# Patient Record
Sex: MALE | Race: WHITE | NOT HISPANIC OR LATINO | Employment: FULL TIME | URBAN - METROPOLITAN AREA
[De-identification: names, ages, dates, MRNs, and addresses within clinical notes are randomized per-mention and may not be internally consistent; named-entity substitution may affect disease eponyms.]

---

## 2023-06-21 LAB
ALANINE AMINOTRANSFERASE (SGPT) (U/L) IN SER/PLAS: 42 U/L (ref 10–52)
ALBUMIN (G/DL) IN SER/PLAS: 4.3 G/DL (ref 3.4–5)
ALKALINE PHOSPHATASE (U/L) IN SER/PLAS: 62 U/L (ref 33–120)
ANION GAP IN SER/PLAS: 8 MMOL/L (ref 10–20)
ASPARTATE AMINOTRANSFERASE (SGOT) (U/L) IN SER/PLAS: 44 U/L (ref 9–39)
BILIRUBIN TOTAL (MG/DL) IN SER/PLAS: 0.4 MG/DL (ref 0–1.2)
CALCIUM (MG/DL) IN SER/PLAS: 9.1 MG/DL (ref 8.6–10.3)
CARBON DIOXIDE, TOTAL (MMOL/L) IN SER/PLAS: 29 MMOL/L (ref 21–32)
CHLORIDE (MMOL/L) IN SER/PLAS: 101 MMOL/L (ref 98–107)
CHOLESTEROL (MG/DL) IN SER/PLAS: 163 MG/DL (ref 0–199)
CHOLESTEROL IN HDL (MG/DL) IN SER/PLAS: 56.8 MG/DL
CHOLESTEROL/HDL RATIO: 2.9
CREATININE (MG/DL) IN SER/PLAS: 0.92 MG/DL (ref 0.5–1.3)
GFR MALE: >90 ML/MIN/1.73M2
GLUCOSE (MG/DL) IN SER/PLAS: 94 MG/DL (ref 74–99)
LDL: 93 MG/DL (ref 0–99)
POTASSIUM (MMOL/L) IN SER/PLAS: 4.1 MMOL/L (ref 3.5–5.3)
PROSTATE SPECIFIC AG (NG/ML) IN SER/PLAS: 0.26 NG/ML (ref 0–4)
PROTEIN TOTAL: 6.8 G/DL (ref 6.4–8.2)
SODIUM (MMOL/L) IN SER/PLAS: 134 MMOL/L (ref 136–145)
TRIGLYCERIDE (MG/DL) IN SER/PLAS: 64 MG/DL (ref 0–149)
UREA NITROGEN (MG/DL) IN SER/PLAS: 13 MG/DL (ref 6–23)
VLDL: 13 MG/DL (ref 0–40)

## 2023-06-26 LAB
METHYLPHENIDATE URINE QUANTITATIVE: 106.8 NG/ML
RITALINIC ACID URINE: >5000 NG/ML

## 2023-10-10 PROBLEM — F98.8 ATTENTION DEFICIT DISORDER (ADD): Status: ACTIVE | Noted: 2023-10-10

## 2023-10-10 PROBLEM — S83.512A LEFT ANTERIOR CRUCIATE LIGAMENT TEAR: Status: ACTIVE | Noted: 2023-10-10

## 2023-10-10 PROBLEM — J30.9 ALLERGIC RHINITIS: Status: ACTIVE | Noted: 2023-10-10

## 2023-10-10 PROBLEM — E55.9 VITAMIN D DEFICIENCY: Status: ACTIVE | Noted: 2023-10-10

## 2023-10-10 PROBLEM — E78.5 HYPERLIPIDEMIA: Status: ACTIVE | Noted: 2023-10-10

## 2023-10-10 RX ORDER — MELOXICAM 15 MG/1
1 TABLET ORAL DAILY PRN
COMMUNITY
Start: 2022-01-18 | End: 2024-01-05 | Stop reason: WASHOUT

## 2023-10-10 RX ORDER — CYCLOBENZAPRINE HCL 10 MG
1 TABLET ORAL NIGHTLY PRN
COMMUNITY
End: 2023-10-11 | Stop reason: SDUPTHER

## 2023-10-10 RX ORDER — CHOLECALCIFEROL (VITAMIN D3) 50 MCG
1 TABLET ORAL DAILY
COMMUNITY

## 2023-10-10 RX ORDER — ROSUVASTATIN CALCIUM 20 MG/1
1 TABLET, COATED ORAL DAILY
COMMUNITY
End: 2023-10-11 | Stop reason: SDUPTHER

## 2023-10-10 RX ORDER — METHYLPHENIDATE HYDROCHLORIDE 54 MG/1
1 TABLET ORAL DAILY
COMMUNITY
Start: 2022-08-18 | End: 2023-10-11 | Stop reason: SDUPTHER

## 2023-10-11 ENCOUNTER — OFFICE VISIT (OUTPATIENT)
Dept: PRIMARY CARE | Facility: CLINIC | Age: 53
End: 2023-10-11
Payer: COMMERCIAL

## 2023-10-11 VITALS
BODY MASS INDEX: 27.2 KG/M2 | DIASTOLIC BLOOD PRESSURE: 85 MMHG | SYSTOLIC BLOOD PRESSURE: 125 MMHG | TEMPERATURE: 97.2 F | WEIGHT: 190 LBS | HEART RATE: 107 BPM | HEIGHT: 70 IN

## 2023-10-11 DIAGNOSIS — E78.5 HYPERLIPIDEMIA, UNSPECIFIED HYPERLIPIDEMIA TYPE: Primary | ICD-10-CM

## 2023-10-11 DIAGNOSIS — F90.0 ATTENTION DEFICIT HYPERACTIVITY DISORDER (ADHD), PREDOMINANTLY INATTENTIVE TYPE: ICD-10-CM

## 2023-10-11 DIAGNOSIS — M62.838 MUSCLE SPASM OF RIGHT SHOULDER: ICD-10-CM

## 2023-10-11 PROBLEM — Z98.890 S/P ACL REPAIR: Status: ACTIVE | Noted: 2022-08-26

## 2023-10-11 PROCEDURE — 90471 IMMUNIZATION ADMIN: CPT | Performed by: FAMILY MEDICINE

## 2023-10-11 PROCEDURE — 99214 OFFICE O/P EST MOD 30 MIN: CPT | Performed by: FAMILY MEDICINE

## 2023-10-11 PROCEDURE — 90686 IIV4 VACC NO PRSV 0.5 ML IM: CPT | Performed by: FAMILY MEDICINE

## 2023-10-11 PROCEDURE — 1036F TOBACCO NON-USER: CPT | Performed by: FAMILY MEDICINE

## 2023-10-11 RX ORDER — CYCLOBENZAPRINE HCL 10 MG
10 TABLET ORAL NIGHTLY PRN
Qty: 30 TABLET | Refills: 1 | Status: SHIPPED | OUTPATIENT
Start: 2023-10-11

## 2023-10-11 RX ORDER — METHYLPHENIDATE HYDROCHLORIDE 100 MG/1
1 CAPSULE ORAL DAILY
COMMUNITY
Start: 2023-08-25 | End: 2023-10-11 | Stop reason: ALTCHOICE

## 2023-10-11 RX ORDER — ROSUVASTATIN CALCIUM 20 MG/1
20 TABLET, COATED ORAL DAILY
Qty: 90 TABLET | Refills: 1 | Status: SHIPPED | OUTPATIENT
Start: 2023-10-11 | End: 2024-02-29 | Stop reason: SDUPTHER

## 2023-10-11 RX ORDER — METHYLPHENIDATE HYDROCHLORIDE 54 MG/1
54 TABLET ORAL DAILY
Qty: 30 TABLET | Refills: 0 | Status: SHIPPED | OUTPATIENT
Start: 2023-10-11 | End: 2023-10-12 | Stop reason: ALTCHOICE

## 2023-10-11 ASSESSMENT — ENCOUNTER SYMPTOMS
FATIGUE: 0
CHEST TIGHTNESS: 0
SHORTNESS OF BREATH: 0
DIZZINESS: 0
PALPITATIONS: 0
NECK PAIN: 1
HEADACHES: 0

## 2023-10-11 ASSESSMENT — PATIENT HEALTH QUESTIONNAIRE - PHQ9
SUM OF ALL RESPONSES TO PHQ9 QUESTIONS 1 AND 2: 0
1. LITTLE INTEREST OR PLEASURE IN DOING THINGS: NOT AT ALL
2. FEELING DOWN, DEPRESSED OR HOPELESS: NOT AT ALL

## 2023-10-11 NOTE — PROGRESS NOTES
OARRS:  Ban Mitchell MD on 10/11/2023  2:24 PM  I have personally reviewed the OARRS report for Adair Beasley. I have considered the risks of abuse, dependence, addiction and diversion and I believe that it is clinically appropriate for Adair Beasley to be prescribed this medication    Is the patient prescribed a combination of a benzodiazepine and opioid?  No    Last Urine Drug Screen / ordered today: No  Recent Results (from the past 8760 hour(s))   Methylphenidate And Metabolite,Conf,Urine    Collection Time: 06/21/23  8:43 AM   Result Value Ref Range    Methylphenidate Urine Quantitative 106.8 ng/mL    Ritalinic acid Urine >5000.0 ng/mL   Drug Screen, Urine With Reflex to Confirmation    Collection Time: 01/25/23 11:03 AM   Result Value Ref Range    DRUG SCREEN COMMENT URINE SEE BELOW     Amphetamine Screen, Urine PRESUMPTIVE NEGATIVE NEGATIVE    Barbiturate Screen, Urine PRESUMPTIVE NEGATIVE NEGATIVE    BENZODIAZEPINE (PRESENCE) IN URINE BY SCREEN METHOD PRESUMPTIVE NEGATIVE NEGATIVE    Cannabinoid Screen, Urine PRESUMPTIVE NEGATIVE NEGATIVE    Cocaine Screen, Urine PRESUMPTIVE NEGATIVE NEGATIVE    Fentanyl, Ur PRESUMPTIVE NEGATIVE NEGATIVE    Methadone Screen, Urine PRESUMPTIVE NEGATIVE NEGATIVE    Opiate Screen, Urine PRESUMPTIVE NEGATIVE NEGATIVE    Oxycodone Screen, Ur PRESUMPTIVE NEGATIVE NEGATIVE    PCP Screen, Urine PRESUMPTIVE NEGATIVE NEGATIVE     Results are as expected.         Controlled Substance Agreement:  Date of the Last Agreement: 01/25/2023  Reviewed Controlled Substance Agreement including but not limited to the benefits, risks, and alternatives to treatment with a Controlled Substance medication(s).    Stimulants:   What is the patient's goal of therapy? Improved focus, task completion    Is this being achieved with current treatment? yes    Activities of Daily Living:   Is your overall impression that this patient is benefiting (symptom reduction outweighs side effects) from  "stimulant therapy? Yes     1. Physical Functioning: Better  2. Family Relationship: Better  3. Social Relationship: Better  4. Mood: Better  5. Sleep Patterns: Better  6. Overall Function: Better  Subjective   Patient ID: Adair Beasley is a 53 y.o. male who presents for Follow-up (3 month, ).    HPI   1.  ADHD-wants to switch back to methylphenidate.  Often forgetting to dose Jornay in the evening.  Continues to feel benefit from taking medication.  2.  Right shoulder/upper back pain-symptoms for several months.  No specific injury.  Often feels tension across the right upper back with radiation of pain into the right shoulder and upper arm.  No numbness or tingling.  Feels right upper extremity is weak.    3.  Hyperlipidemia-stable on statin     Review of Systems   Constitutional:  Negative for fatigue.   Respiratory:  Negative for chest tightness and shortness of breath.    Cardiovascular:  Negative for chest pain, palpitations and leg swelling.   Musculoskeletal:  Positive for neck pain.   Neurological:  Negative for dizziness and headaches.       Objective   /85 (BP Location: Left arm, Patient Position: Sitting)   Pulse 107   Temp 36.2 °C (97.2 °F)   Ht 1.778 m (5' 10\")   Wt 86.2 kg (190 lb)   BMI 27.26 kg/m²     Physical Exam  Constitutional:       General: He is not in acute distress.     Appearance: Normal appearance.   HENT:      Right Ear: Tympanic membrane and ear canal normal.      Left Ear: Tympanic membrane and ear canal normal.      Nose: No congestion or rhinorrhea.      Mouth/Throat:      Mouth: Mucous membranes are dry.      Pharynx: Oropharynx is clear.   Eyes:      Extraocular Movements: Extraocular movements intact.      Conjunctiva/sclera: Conjunctivae normal.      Pupils: Pupils are equal, round, and reactive to light.   Cardiovascular:      Rate and Rhythm: Normal rate and regular rhythm.      Pulses: Normal pulses.      Heart sounds: Normal heart sounds. No murmur " heard.  Pulmonary:      Effort: Pulmonary effort is normal. No respiratory distress.      Breath sounds: Normal breath sounds.   Abdominal:      General: Abdomen is flat. Bowel sounds are normal.      Palpations: Abdomen is soft.      Tenderness: There is no abdominal tenderness.   Musculoskeletal:         General: Tenderness present. Normal range of motion.      Right shoulder: Tenderness present. Normal range of motion.        Arms:       Cervical back: Normal range of motion and neck supple.      Comments: Palpable muscle spasm in right upper back including a upper trapezius and rhomboids.   Lymphadenopathy:      Cervical: No cervical adenopathy.   Skin:     General: Skin is warm and dry.   Neurological:      General: No focal deficit present.      Mental Status: He is alert and oriented to person, place, and time.   Psychiatric:         Mood and Affect: Mood normal.         Thought Content: Thought content normal.         Assessment/Plan   Diagnoses and all orders for this visit:  Hyperlipidemia, unspecified hyperlipidemia type  -     rosuvastatin (Crestor) 20 mg tablet; Take 1 tablet (20 mg) by mouth once daily.  Attention deficit hyperactivity disorder (ADHD), predominantly inattentive type  -     methylphenidate (Concerta) 54 mg ER tablet; Take 1 tablet (54 mg) by mouth once daily.  Muscle spasm of right shoulder  -     Referral to Orthopaedic Surgery; Future  -     cyclobenzaprine (Flexeril) 10 mg tablet; Take 1 tablet (10 mg) by mouth as needed at bedtime for muscle spasms.  Other orders  -     Flu vaccine (IIV4) age 6 months and greater, preservative free  -     Follow Up In Primary Care - Established; Future

## 2023-11-22 ENCOUNTER — ANCILLARY PROCEDURE (OUTPATIENT)
Dept: RADIOLOGY | Facility: CLINIC | Age: 53
End: 2023-11-22
Payer: COMMERCIAL

## 2023-11-22 ENCOUNTER — OFFICE VISIT (OUTPATIENT)
Dept: ORTHOPEDIC SURGERY | Facility: CLINIC | Age: 53
End: 2023-11-22
Payer: COMMERCIAL

## 2023-11-22 DIAGNOSIS — M25.511 RIGHT SHOULDER PAIN, UNSPECIFIED CHRONICITY: ICD-10-CM

## 2023-11-22 DIAGNOSIS — M54.12 CERVICAL RADICULOPATHY: ICD-10-CM

## 2023-11-22 DIAGNOSIS — M62.838 MUSCLE SPASM OF RIGHT SHOULDER: ICD-10-CM

## 2023-11-22 PROCEDURE — 1036F TOBACCO NON-USER: CPT | Performed by: STUDENT IN AN ORGANIZED HEALTH CARE EDUCATION/TRAINING PROGRAM

## 2023-11-22 PROCEDURE — 72050 X-RAY EXAM NECK SPINE 4/5VWS: CPT | Performed by: RADIOLOGY

## 2023-11-22 PROCEDURE — 72050 X-RAY EXAM NECK SPINE 4/5VWS: CPT | Mod: FY

## 2023-11-22 PROCEDURE — 73030 X-RAY EXAM OF SHOULDER: CPT | Mod: RIGHT SIDE | Performed by: RADIOLOGY

## 2023-11-22 PROCEDURE — 99204 OFFICE O/P NEW MOD 45 MIN: CPT | Performed by: STUDENT IN AN ORGANIZED HEALTH CARE EDUCATION/TRAINING PROGRAM

## 2023-11-22 PROCEDURE — 99214 OFFICE O/P EST MOD 30 MIN: CPT | Mod: 25 | Performed by: STUDENT IN AN ORGANIZED HEALTH CARE EDUCATION/TRAINING PROGRAM

## 2023-11-22 PROCEDURE — 73030 X-RAY EXAM OF SHOULDER: CPT | Mod: RT

## 2023-11-22 ASSESSMENT — PAIN - FUNCTIONAL ASSESSMENT: PAIN_FUNCTIONAL_ASSESSMENT: 0-10

## 2023-11-22 ASSESSMENT — PAIN SCALES - GENERAL: PAINLEVEL_OUTOF10: 1

## 2023-11-22 NOTE — PROGRESS NOTES
Chief Complaint   Patient presents with    Right Shoulder - Pain     Rt Shoulder Pain, Xrays Today       HPI  53-year-old male presents today for evaluation of right shoulder pain.  Patient states that this pain begins about the posterior aspect of the scapula about the trapezius muscle and radiates down into his forearm.  Denies any history of trauma.  Currently seeing a chiropractor which has provided some relief.  Very active working out lifting weights.  Noticed that he has some weakness particularly with biceps curls in comparison to the other side.    History reviewed. No pertinent past medical history.    Past Surgical History:   Procedure Laterality Date    OTHER SURGICAL HISTORY  11/06/2021    Colonoscopy    OTHER SURGICAL HISTORY  09/12/2022    Knee surgery        No Known Allergies     Physical exam    General: Alert and oriented to place, person, and time.  No acute distress and breathing comfortably; pleasant and cooperative with the examination.  HEENT: Head is normocephalic and atraumatic.  Neck: Supple, no visible swelling.  Cardiovascular: Good perfusion to the affected extremity.  Lungs: No audible wheezing or labored breathing.  Abdomen: Nondistended  HEME/Lymph : No visible abnormalities bilateral lower extremity    Extremity:  Right shoulder:  Skin healthy to gross inspection  No ecchymosis, no edema, no gross atrophy  No tenderness to palpation over acromioclavicular joint  No tenderness to palpation over biceps tendon  Mild tenderness to palpation over the cervical spine   Mild discomfort with palpation of the trapezius musculature    ROM:  Full forward flexion  Full external rotation  IR symmetric to contralateral side  Strength:  5/5 Resisted elevation  5/5 Resisted external rotation     Negative lift off test   Equivocal Spurling´s test  Negative Neer and Hawking´s test  Negative Speed's test  Neurovascular exam normal distally        Diagnostics:  Multiple views right shoulder: My  interpretation as follows: No acute osseous abnormality no fracture or dislocation appreciated maintained joint space within the glenohumeral articulation.    Multiple views of the cervical spine: No acute osseous abnormality no fracture or dislocation appreciated    Procedure:  Procedures    Assessment:  53-year-old male with right upper extremity cervical radiculopathy    Treatment plan:  The natural history of the condition and its associated treatment alternatives including surgical and nonsurgical options were discussed with the patient at length.  Will give patient a prescription for dedicated physical therapy for cervical radiculopathy  Will give patient also a prescription for naproxen and see how he does over the next 6 weeks  If he fails to improve may benefit from an MRI of the cervical spine  All of the patient's questions were answered.    We discussed the use of nonsteroidal anti-inflammatory drugs as part of a treatment plan. We discussed that these may result in GI upset and/or bleeding. Any stomach pain or dark hard stools would be reason to discontinue use. We discussed that when used over the long-term these medications can result in altered renal or hepatic function, and that routine use beyond 3 months requires follow-up with their primary provider for monitoring.  They expressed their understanding and agree with the plan.

## 2023-11-27 ENCOUNTER — TELEPHONE (OUTPATIENT)
Dept: ORTHOPEDIC SURGERY | Facility: CLINIC | Age: 53
End: 2023-11-27
Payer: COMMERCIAL

## 2023-11-27 DIAGNOSIS — M25.511 RIGHT SHOULDER PAIN, UNSPECIFIED CHRONICITY: Primary | ICD-10-CM

## 2023-11-27 RX ORDER — NAPROXEN 500 MG/1
500 TABLET ORAL 2 TIMES DAILY
Qty: 60 TABLET | Refills: 1 | Status: SHIPPED | OUTPATIENT
Start: 2023-11-27 | End: 2024-01-26

## 2023-12-20 ENCOUNTER — EVALUATION (OUTPATIENT)
Dept: PHYSICAL THERAPY | Facility: CLINIC | Age: 53
End: 2023-12-20
Payer: COMMERCIAL

## 2023-12-20 DIAGNOSIS — M54.12 CERVICAL RADICULOPATHY: ICD-10-CM

## 2023-12-20 PROCEDURE — 97140 MANUAL THERAPY 1/> REGIONS: CPT | Mod: GP

## 2023-12-20 PROCEDURE — 97110 THERAPEUTIC EXERCISES: CPT | Mod: GP

## 2023-12-20 PROCEDURE — 97161 PT EVAL LOW COMPLEX 20 MIN: CPT | Mod: GP

## 2023-12-20 PROCEDURE — 97535 SELF CARE MNGMENT TRAINING: CPT | Mod: GP

## 2023-12-20 NOTE — PROGRESS NOTES
Physical Therapy Evaluation and Treatment      Patient Name: Adair Beasley  MRN: [id number]  Today's Date: 12/20/2023  Time Calculation  Start Time: 1042  Stop Time: 1136  Time Calculation (min): 54 min    Insurance:  Visit number:  1  AIM Auth required within 2 days      Assessment:  Pt. 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 strength/endurance, & abnormal neuromotor control.  Pt. is likely to benefit from skilled interventions to address their impairments, & treatment to emphasize dry needling to decrease pain & improve function, manual techniques to decrease pain & improve joint/soft-tissue mobility, & scapular & GHJ exercises to increase strength, endurance, & neuromotor control.    Standardized testing and measures administered today reveal that the patient has multiple impairments in body structures and functions, activity limitations, and participation restrictions.  The patient has no personal factors and comorbidities that may serve as barriers affecting the plan of care.  Skilled PT services are warranted in order to realize measurable change in the above outcome measures and achieve improvements in the patient's functional status and individual goals.      Plan:  OP PT Plan  Treatment/Interventions: Blood flow restriction therapy, Dry needling, Education/ Instruction, Electrical stimulation, Gait training, Manual therapy, Neuromuscular re-education, Self care/ home management, Therapeutic activities, Therapeutic exercises, Vasopneumatic device  PT Plan: Skilled PT  PT Frequency: 1 time(s) per week  Duration: 10 weeks  Onset Date: 5/1/23  Certification Period Start Date: 12/20/23  Certification Period End Date: TBD  Number of Treatments Authorized: TBD  Rehab Potential: Good  Plan of Care Agreement: Patient      Current Problem:   1. Cervical radiculopathy  Referral to Physical  Therapy    Follow Up In Physical Therapy          Subjective    Adair Beasley is a 53 y.o. male who presents with c/o R shoulder pain.  Patient states symptoms began insidiously in May '23.  Pt had been seeing a chiropractor for his back & he's usually able to 'straighten it out'.  Pt's mom was sick over the summer so he was taking care of her & unable to take care of his symptoms.  Recently he went to see Dr Herndon who dx him with a cervical radiculopathy & referred him to therapy.    Pt works out regularly & has been taking it easy, but he's concerned the symptoms will get aggravated with ramping up his activities.  Pt reports he had shooting pain & numbness into the UE when the symptoms started, but that seems to be better.    Pain Intensity:  0/10 currently at rest, (10 = worst imaginable pain), 2-3/10 at worst.    Description:  shooting  Duration:   intermittent  Status:  progressively improving     Reports symptoms are aggravated with sitting/working at computer, lifting weights/exercising,  and alleviated with neck 'stretcher' (home cervical traction device).      Objective   Observations:  pt was pleasant, cooperative, & A+O x3.  Visual inspection revealed B (R > L) ant tipped & rounded shoulders.  Distal PMS was intact & S/S of infection noted.    Palpatory Exam:  pt. c/o their 'familiar' pain along the R levator scapulae. Hypertonicity & tenderness noted in the R cervical/thoracic paraspinals, upper trap, levator scapulae, rhomboids, teres major, latissimus dorsi, pec major/minor, & RTC mm.  Segmental hypomobility noted in the upper/mid thoracic region with CPA assessment.     Cervical Dermatomes + Myotomes (all intact to light touch + WNL unless otherwise noted):  C4:    C5:  increased sensitivity on the R  C6:  increased sensitivity on the R  C7:  increased sensitivity on the R  C8:    T1:      Cervical ROM:  Flexion:  WNL   Ext:  ~25% ROM loss   R Rot:  65 deg, no change   L Rot:  70 deg, no change    R Side-bend:  45 deg, no change   L Side-bend:  30 dec no change     Cervical Special Tests:  Alar Lig. Test:  [-]  Sharp Jenny Test:  [-]  Compression:  [-]  Distraction:  [-]  Quadrant Test:  [-]  Vertebral a. Test:  [-]  Flexion-Rotation:  [-]      Shoulder ROM __ __R [deg]___ ___L [deg]___    Flex______________ ___180_____ ___180_____   ABD/ER__________ ___90_____ ___90_____   Hor ADD_________ ___30*_____ ___50_____  ER in 0/90________ ___70_____ ___75_____   Ext/IR/Pron______ ___T6*_____ ___T5_____   ER in 90/90______ ___95_____ ___90_____   IR in 90/90_______ ___35_____ ___75_____    * = painful         Shoulder MMT__      ____R_____        ____L______      Flex______________ ___5/5____ ___5/5___   ABD______________ ___5/5____ ___5/5___   ER in 0 deg______ ___5/5____ ___5/5___   IR in 0 deg_______ ___5/5____ ___5/5___   * = painful          Shoulder Tests_____     ____R____          ____L_____      Painful Arc________ ___[-]_____ ___[-]_____   Full Can__________ ___[+]_____ ___[-]_____   Empty Can________ ___[-]_____ ___[-]_____   Neers____________ ___[-]_____ ___[-]_____   Mora-Kennedy__ ___[+]_____ ___[-]_____    Bear Hug Test_____ ___[-]_____ ___[-]_____   Belly Press Sign____ ___[-]_____ ___[-]_____   IR Lift Off_________ ___[+]_____ ___[-]_____   Hor ADD___________ ___[+]_____ ___[-]_____           Treatment today included:    PT Evaluation (61730):  14 minutes      Manual Therapy (20695):  10 minutes  Soft tissue mobilization: superficial effleurage, petrissage, cross-friction, & deep pressure to the R levator scapulae, upper trap, pec major/minor, & RTC mm.  Muscle energy techniques (MET):  R GHJ ER to improve IR  Joint mobilizations:  supine R GHJ A/P (G IV)    Appropriate force, direction, amplitude, & velocity for selected techniques to improve joint & soft tissue mobility & enhance ADL performance.  Rationale & procedure given for above treatment techniques, & verbal consent was obtained prior  to initiating treatment.        Therapeutic Exercises (89204):  20 minutes    Access Code: [email or web address]  URL: [email or web address]  Date: 12/20/2023  Prepared by: Trevor Barillas    Exercises  - SELF MASSAGE BALL - INFRASPINATUS  - 5-7 x weekly - 1-2 min hold  - SELF MASSAGE BALL - UPPER TRAP  - 5-7 x weekly - 1-2 min hold  - SELF MASSAGE BALL - PECTORALIS  - 5-7 x weekly - 1-2 min hold  - Sleeper Stretch (Mirrored)  - 5-7 x weekly - 2-3 sets - 30-60 sec hold  - Standing Cross Body Shoulder Stretch at Wall  - 5-7 x weekly - 2-3 sets - 30-60 sec hold  - Standing Pec Stretch at Wall  - 5-7 x weekly - 2-3 sets - 30-60 sec hold  - Single Arm Doorway Pec Stretch at 90 Degrees Abduction     * = added to HEP.  Sheet with exercise descriptions and images issued.  Skilled intervention utilized in the appropriate selection & application of above exercises.  Verbal and tactile cues provided for proper form and technique.  Pt. demonstrated appropriate form & verbalized understanding of optimal technique for above exercises.        Self Care / Home Management (40690):  10 minutes  The patient was educated on:  the importance of positioning, proper posture, and body mechanics, joint mechanics and pathology, general tissue healing time, the appropriate use of heat and cold to control pain and inflammation, the importance of general therapeutic exercise, especially to stay within pain-free ROM, specific anatomy, function, & regional interdependence of involved areas, & likely cause of impairments & POC.  Pt's questions were answered to their satisfaction, & pt. verbalized understanding & agreement with POC.      Outcome Measures:  Other Measures  Neck Disability Index (NDI):  7/50 or 14%      Goals:  By discharge, pt. will accomplish:  Demonstrate compliance & independence with HEP  Increase cervical ROM to pain free + WNL  Increase DNF endurance to pain free + WNL (> 40 sec for M; > 30 sec for F)  Increased B GHJ  MMT to pain free + WNL  Decrease score of NDI by > 5 points to meet the MCID  Report decrease in pain by greater than or equal to 2 points to meet the MCID  Perform ADLs without an increase symptoms  Pt. will be able to sleep through the night without an increase in symptoms  Achieve pt. specific goals including: be able to exercise without pain

## 2024-01-05 ENCOUNTER — OFFICE VISIT (OUTPATIENT)
Dept: PRIMARY CARE | Facility: CLINIC | Age: 54
End: 2024-01-05
Payer: COMMERCIAL

## 2024-01-05 VITALS
SYSTOLIC BLOOD PRESSURE: 130 MMHG | HEIGHT: 70 IN | DIASTOLIC BLOOD PRESSURE: 79 MMHG | BODY MASS INDEX: 26.86 KG/M2 | HEART RATE: 67 BPM | WEIGHT: 187.6 LBS | TEMPERATURE: 97.5 F

## 2024-01-05 DIAGNOSIS — F98.8 ATTENTION DEFICIT DISORDER, UNSPECIFIED HYPERACTIVITY PRESENCE: ICD-10-CM

## 2024-01-05 DIAGNOSIS — Z02.83 ENCOUNTER FOR DRUG SCREENING: Primary | ICD-10-CM

## 2024-01-05 DIAGNOSIS — Z12.5 ENCOUNTER FOR SCREENING FOR MALIGNANT NEOPLASM OF PROSTATE: ICD-10-CM

## 2024-01-05 DIAGNOSIS — F90.0 ATTENTION DEFICIT HYPERACTIVITY DISORDER (ADHD), PREDOMINANTLY INATTENTIVE TYPE: ICD-10-CM

## 2024-01-05 DIAGNOSIS — E78.5 HYPERLIPIDEMIA, UNSPECIFIED HYPERLIPIDEMIA TYPE: ICD-10-CM

## 2024-01-05 DIAGNOSIS — Z00.00 WELL ADULT HEALTH CHECK: ICD-10-CM

## 2024-01-05 PROCEDURE — 80307 DRUG TEST PRSMV CHEM ANLYZR: CPT

## 2024-01-05 PROCEDURE — 1036F TOBACCO NON-USER: CPT | Performed by: FAMILY MEDICINE

## 2024-01-05 PROCEDURE — 99214 OFFICE O/P EST MOD 30 MIN: CPT | Performed by: FAMILY MEDICINE

## 2024-01-05 PROCEDURE — 80324 DRUG SCREEN AMPHETAMINES 1/2: CPT

## 2024-01-05 RX ORDER — METHYLPHENIDATE HYDROCHLORIDE 100 MG/1
1 CAPSULE ORAL DAILY
Qty: 30 CAPSULE | Refills: 0 | Status: SHIPPED | OUTPATIENT
Start: 2024-01-05 | End: 2024-02-15 | Stop reason: SDUPTHER

## 2024-01-05 ASSESSMENT — PATIENT HEALTH QUESTIONNAIRE - PHQ9
2. FEELING DOWN, DEPRESSED OR HOPELESS: NOT AT ALL
SUM OF ALL RESPONSES TO PHQ9 QUESTIONS 1 AND 2: 0
1. LITTLE INTEREST OR PLEASURE IN DOING THINGS: NOT AT ALL

## 2024-01-05 NOTE — PROGRESS NOTES
OARRS:  Ban Mitchell MD on 1/5/2024 12:38 PM  I have personally reviewed the OARRS report for Adair MOORE Ramos. I have considered the risks of abuse, dependence, addiction and diversion    Is the patient prescribed a combination of a benzodiazepine and opioid?  Yes, I feel it is clincially indicated to continue the medication and have discussed with the patient risks/benefits/alternatives.    Last Urine Drug Screen / ordered today: Yes  Recent Results (from the past 8760 hour(s))   Methylphenidate And Metabolite,Conf,Urine    Collection Time: 06/21/23  8:43 AM   Result Value Ref Range    Methylphenidate Urine Quantitative 106.8 ng/mL    Ritalinic acid Urine >5000.0 ng/mL   Drug Screen, Urine With Reflex to Confirmation    Collection Time: 01/25/23 11:03 AM   Result Value Ref Range    DRUG SCREEN COMMENT URINE SEE BELOW     Amphetamine Screen, Urine PRESUMPTIVE NEGATIVE NEGATIVE    Barbiturate Screen, Urine PRESUMPTIVE NEGATIVE NEGATIVE    BENZODIAZEPINE (PRESENCE) IN URINE BY SCREEN METHOD PRESUMPTIVE NEGATIVE NEGATIVE    Cannabinoid Screen, Urine PRESUMPTIVE NEGATIVE NEGATIVE    Cocaine Screen, Urine PRESUMPTIVE NEGATIVE NEGATIVE    Fentanyl, Ur PRESUMPTIVE NEGATIVE NEGATIVE    Methadone Screen, Urine PRESUMPTIVE NEGATIVE NEGATIVE    Opiate Screen, Urine PRESUMPTIVE NEGATIVE NEGATIVE    Oxycodone Screen, Ur PRESUMPTIVE NEGATIVE NEGATIVE    PCP Screen, Urine PRESUMPTIVE NEGATIVE NEGATIVE     Results are as expected.         Controlled Substance Agreement:  Date of the Last Agreement: 01/05/2024   Reviewed Controlled Substance Agreement including but not limited to the benefits, risks, and alternatives to treatment with a Controlled Substance medication(s).    Stimulants:   What is the patient's goal of therapy? Improve focus/task completion    Is this being achieved with current treatment? yes    Activities of Daily Living:   Is your overall impression that this patient is benefiting (symptom reduction  "outweighs side effects) from stimulant therapy? Yes     1. Physical Functioning: Better  2. Family Relationship: Better  3. Social Relationship: Better  4. Mood: Better  5. Sleep Patterns: Same  6. Overall Function: Better  Subjective   Patient ID: Adair Beasley is a 53 y.o. male who presents for Follow-up (Medication follow up jornay).    HPI   Here for follow-up medications.  1.  Attention deaf disorder-doing well on Jornay.  Proved compliance with consistent evening dosing.  Rarely misses dose at present.  It has improved duration of action keeps him focused and motivated throughout day.  Denies adverse effect.  2.  Hyperlipidemia-stable on statin.  No chest pains, palpitations, dyspnea on exertion  Review of Systems  Per HPI  Objective   /79 (BP Location: Left arm, Patient Position: Sitting, BP Cuff Size: Adult)   Pulse 67   Temp 36.4 °C (97.5 °F)   Ht 1.778 m (5' 10\")   Wt 85.1 kg (187 lb 9.6 oz)   BMI 26.92 kg/m²     Physical Exam  Constitutional:       Appearance: Normal appearance. He is normal weight.   HENT:      Mouth/Throat:      Mouth: Mucous membranes are moist.      Pharynx: Oropharynx is clear.   Eyes:      Extraocular Movements: Extraocular movements intact.      Conjunctiva/sclera: Conjunctivae normal.   Cardiovascular:      Rate and Rhythm: Normal rate and regular rhythm.      Pulses: Normal pulses.      Heart sounds: Normal heart sounds.   Pulmonary:      Effort: Pulmonary effort is normal.   Abdominal:      General: Abdomen is flat.      Palpations: Abdomen is soft.      Tenderness: There is no abdominal tenderness.   Musculoskeletal:      Cervical back: Normal range of motion and neck supple.      Right lower leg: No edema.      Left lower leg: No edema.   Lymphadenopathy:      Cervical: No cervical adenopathy.   Neurological:      Mental Status: He is alert.         Assessment/Plan   Diagnoses and all orders for this visit:  Encounter for drug screening  -     Drug Screen, Urine " With Reflex to Confirmation  -     Amphetamine Confirm, Urine  Attention deficit disorder, unspecified hyperactivity presence  Well adult health check  -     Comprehensive Metabolic Panel; Future  Encounter for screening for malignant neoplasm of prostate  -     Prostate Specific Antigen; Future  Hyperlipidemia, unspecified hyperlipidemia type  -     CBC and Auto Differential; Future  -     Comprehensive Metabolic Panel; Future  -     Lipid Panel; Future  Attention deficit hyperactivity disorder (ADHD), predominantly inattentive type  -     Jornay  mg 24 hour capsule; Take 1 capsule (100 mg) by mouth once daily.  Other orders  -     Follow Up In Primary Care - Established  -     Follow Up In Primary Care - Established; Future

## 2024-01-06 LAB
AMPHETAMINES UR QL SCN: NORMAL
BARBITURATES UR QL SCN: NORMAL
BENZODIAZ UR QL SCN: NORMAL
BZE UR QL SCN: NORMAL
CANNABINOIDS UR QL SCN: NORMAL
FENTANYL+NORFENTANYL UR QL SCN: NORMAL
OPIATES UR QL SCN: NORMAL
OXYCODONE+OXYMORPHONE UR QL SCN: NORMAL
PCP UR QL SCN: NORMAL

## 2024-01-10 LAB
AMPHET UR-MCNC: <50 NG/ML
MDA UR-MCNC: <200 NG/ML
MDEA UR-MCNC: <200 NG/ML
MDMA UR-MCNC: <200 NG/ML
METHAMPHET UR-MCNC: <200 NG/ML
PHENTERMINE UR CFM-MCNC: <200 NG/ML

## 2024-01-18 ENCOUNTER — TREATMENT (OUTPATIENT)
Dept: PHYSICAL THERAPY | Facility: CLINIC | Age: 54
End: 2024-01-18
Payer: COMMERCIAL

## 2024-01-18 DIAGNOSIS — M54.12 CERVICAL RADICULOPATHY: ICD-10-CM

## 2024-01-18 PROCEDURE — 97110 THERAPEUTIC EXERCISES: CPT | Mod: GP

## 2024-01-18 NOTE — PROGRESS NOTES
Physical Therapy Treatment      Patient Name: Adair Beasley  MRN: [id number]  Today's Date: 1/18/2024  Time Calculation  Start Time: 1058  Stop Time: 1152  Time Calculation (min): 54 min    Insurance:  Visit number:  2 of 4  4 visits auth 12/20/23 - 1/18/24      Assessment:  Overall the pt has made steady progress toward his goals & his cervical & R GHJ ROM were improved compared to his evaluation.  He still demo'd several areas of hypertonicity & decreased ROM with c/o increased pain during certain R GHJ motions - most noticeably with reaching behind his back (combined ext/IR).  Additionally, he still demo'd noticeable weakness with his R UE, specifically with triceps strength.  Anticipate pt's symptoms will continue to improve with regular HEP adherence, but he would still benefit from at least 1 additional f/u in ~1 month to reassess his progress & ensure appropriate progressions.        Plan:  Pt to f/u 1 additional time in ~1 month to reassess his progress with potential for D/C at that time, pending progress.      Current Problem:   1. Cervical radiculopathy  Follow Up In Physical Therapy          Subjective    Pt. 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 strength/endurance, & abnormal neuromotor control.        Pt reports his pain is getting better, but his weakness is still there.  States he was trying to lift weights a couple days ago & had to drop a weight because he didn't have the strength.  He also notices weakness attempting complete more challenging ADLs (i.e. pushing open a heavy door, push up off the ground, etc).  He reports compliance with his HEP & denies any pain/problems with them.      Objective   Observations:   pt. c/o their 'familiar' pain along the R levator scapulae. Hypertonicity & tenderness noted in the R cervical/thoracic paraspinals, upper trap, levator  scapulae, rhomboids, teres major, latissimus dorsi, pec major/minor, & RTC mm.  Segmental hypomobility noted in the upper/mid thoracic region with CPA assessment.     Cervical ROM:  grossly pain free + WNL    Shoulder ROM __ __R [deg]___ ___L [deg]___    Flex______________ ___180_____ ___180_____   ABD/ER__________ ___90_____ ___90_____   Hor ADD_________ ___50_____ ___60_____  ER in 0/90________ ___70_____ ___75_____   Ext/IR/Pron______ ___T6*_____ ___T5_____   ER in 90/90______ ___95_____ ___90_____   IR in 90/90_______ ___35_____ ___75_____    * = painful           Shoulder MMT (measured via MicroFET2):    Flex  -  R:  30.4 lbs*  -  L:  30.7 lbs  -  LSI:  99.0%    ABD  -  R:  24.2 lbs  -  L:  21.7 lbs  -  LSI:  111.5%    ER in 0 deg ABD  -  R:  33.1 lbs  -  L:  32.9 lbs  -  LSI:  100.6%    IR in 0 deg ABD  -  R:  44.2 lbs  -  L:  40.3 lbs  -  LSI:  109.7%    ER in 90 deg ABD  -  R:  32.7 lbs  -  L:  30.6 lbs  -  LSI:  106.9%    IR in 90 deg ABD  -  R:  40.3 lbs  -  L:  43.9 lbs  -  LSI:  91.8%    Elbow MMT (measured via MicroFET2):    Flex  -  R:  63.6 lbs  -  L:  59.8 lbs  -  LSI:  106.4%    Ext  -  R:  31.4 lbs  -  L:  45.9 lbs  -  LSI:  68.4%      Therapeutic Exercises (61599):  54 minutes  - Reassessment  - Standing UBE x 6 min (3 min forward, 3 min retro)  - Standing triceps press  - Standing triceps press with shoulder in 90 deg flex  - Standing chest press    Verbally reviewed but not actively performed today:   - SELF MASSAGE BALL - INFRASPINATUS    - SELF MASSAGE BALL - UPPER TRAP    - SELF MASSAGE BALL - PECTORALIS   - Sleeper Stretch (Mirrored)   - Standing Cross Body Shoulder Stretch at Wall    - Standing Pec Stretch at Wall   - Single Arm Doorway Pec Stretch at 90 Degrees Abduction     * = added to HEP.  Sheet with exercise descriptions and images issued.  Skilled intervention utilized in the appropriate selection & application of above exercises.  Verbal and tactile cues provided for proper form and  technique.  Pt. demonstrated appropriate form & verbalized understanding of optimal technique for above exercises.        Reviewed & updated current HEP:    Access Code: [email or web address]  URL: [email or web address]  Date: 12/20/2023  Prepared by: Trevor Barillas    Exercises  - SELF MASSAGE BALL - INFRASPINATUS  - 5-7 x weekly - 1-2 min hold  - SELF MASSAGE BALL - UPPER TRAP  - 5-7 x weekly - 1-2 min hold  - SELF MASSAGE BALL - PECTORALIS  - 5-7 x weekly - 1-2 min hold  - Sleeper Stretch (Mirrored)  - 5-7 x weekly - 2-3 sets - 30-60 sec hold  - Standing Cross Body Shoulder Stretch at Wall  - 5-7 x weekly - 2-3 sets - 30-60 sec hold  - Standing Pec Stretch at Wall  - 5-7 x weekly - 2-3 sets - 30-60 sec hold  - Single Arm Doorway Pec Stretch at 90 Degrees Abduction     Large portion of pt. care time devoted to reviewing pt's current symptoms & determining optimal manual techniques with appropriate HEP modifications.  Discussed importance of regular HEP adherence, & stressed importance of proper form.  Pt. educated about differences between post-exercises soreness vs. increased 'familiar' pain, & reviewed appropriate progressions/regressions with exercises/activities based on symptoms.  Pt. verbalized understanding & agreement.

## 2024-02-13 ENCOUNTER — TREATMENT (OUTPATIENT)
Dept: PHYSICAL THERAPY | Facility: CLINIC | Age: 54
End: 2024-02-13
Payer: COMMERCIAL

## 2024-02-13 DIAGNOSIS — M54.12 RADICULOPATHY, CERVICAL REGION: ICD-10-CM

## 2024-02-13 PROCEDURE — 97110 THERAPEUTIC EXERCISES: CPT | Mod: GP

## 2024-02-13 NOTE — PROGRESS NOTES
Physical Therapy Treatment      Patient Name: Adair Beasley  MRN: [id number]  Today's Date: 2/13/2024  Time Calculation  Start Time: 1000  Stop Time: 1026  Time Calculation (min): 26 min    Insurance:  Visit number:  3 of 4  4 visits auth 1/19/24 - 2/17/24      Assessment:  Pt has continued to see slow, but steady improvements - he was pain free with ROM + MMT, but he still demo'd some weakness with R elbow extension.  However, this was ~10% improved since his last f/u & will likely continue to see steady improvements provided he remains consistent with regular HEP adherence.  At this time, the pt appears ready to transition to IND management of symptoms & will only f/u in the clinic prn.      Plan:  D/C pt from care.  Pt. instructed to continue with HEP as prescribed & would only f/u in the clinic prn.        Current Problem:   1. Radiculopathy, cervical region  Follow Up In Physical Therapy          Subjective    Pt. 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 strength/endurance, & abnormal neuromotor control.        Pt reports he's been getting better overall, but it's 'still there'.  He still notices some weakness, but the numbness/soreness is gone.  Pt believes he is ready to transition to IND management of symptoms.      Objective   Observations:   pt was grossly pain free to bony & soft-tissue landmarks of the cervical & R GHJ regions    Cervical ROM:  grossly pain free + WNL    Shoulder ROM :  grossly pain free + WNL    Elbow MMT (measured via MicroFET2):    Ext  -  R:  34.4 lbs (was 31.4 lbs)  -  L:  51.0 lbs (was 45.9 lbs)  -  LSI:  67.5% (was 68.4%)      Therapeutic Exercises (58352):  26 minutes  - Reassessment    Reviewed & updated current HEP:    Access Code: [email or web address]  URL: [email or web address]  Date: 12/20/2023  Prepared by: Trevor Barillas    Exercises  -  SELF MASSAGE BALL - INFRASPINATUS  - 5-7 x weekly - 1-2 min hold  - SELF MASSAGE BALL - UPPER TRAP  - 5-7 x weekly - 1-2 min hold  - SELF MASSAGE BALL - PECTORALIS  - 5-7 x weekly - 1-2 min hold  - Sleeper Stretch (Mirrored)  - 5-7 x weekly - 2-3 sets - 30-60 sec hold  - Standing Cross Body Shoulder Stretch at Wall  - 5-7 x weekly - 2-3 sets - 30-60 sec hold  - Standing Pec Stretch at Wall  - 5-7 x weekly - 2-3 sets - 30-60 sec hold  - Single Arm Doorway Pec Stretch at 90 Degrees Abduction     Large portion of pt. care time devoted to reviewing pt's current symptoms & determining optimal manual techniques with appropriate HEP modifications.  Reviewed importance of regular HEP adherence, & stressed importance of proper form.  Reviewed differences between post-exercises soreness vs. increased 'familiar' pain, & reviewed appropriate progressions/regressions with exercises/activities based on symptoms.  Lengthy discussion regarding fitness training, how to calculate 1 RM based off 3-5 rep max.  Reviewed physiological responses to training at various intensities & discussed strategies to continue making improvements.  Discussed POC & potential to transition to IND management of symptoms & pt. verbalized understanding & agreement.

## 2024-02-15 ENCOUNTER — PATIENT MESSAGE (OUTPATIENT)
Dept: PRIMARY CARE | Facility: CLINIC | Age: 54
End: 2024-02-15
Payer: COMMERCIAL

## 2024-02-15 DIAGNOSIS — F90.0 ATTENTION DEFICIT HYPERACTIVITY DISORDER (ADHD), PREDOMINANTLY INATTENTIVE TYPE: ICD-10-CM

## 2024-02-15 RX ORDER — METHYLPHENIDATE HYDROCHLORIDE 100 MG/1
1 CAPSULE ORAL DAILY
Qty: 30 CAPSULE | Refills: 0 | Status: SHIPPED | OUTPATIENT
Start: 2024-02-15 | End: 2024-03-20 | Stop reason: ALTCHOICE

## 2024-02-20 ENCOUNTER — APPOINTMENT (OUTPATIENT)
Dept: PHYSICAL THERAPY | Facility: CLINIC | Age: 54
End: 2024-02-20
Payer: COMMERCIAL

## 2024-02-29 ENCOUNTER — PATIENT MESSAGE (OUTPATIENT)
Dept: PRIMARY CARE | Facility: CLINIC | Age: 54
End: 2024-02-29
Payer: COMMERCIAL

## 2024-02-29 DIAGNOSIS — E78.5 HYPERLIPIDEMIA, UNSPECIFIED HYPERLIPIDEMIA TYPE: ICD-10-CM

## 2024-02-29 RX ORDER — ROSUVASTATIN CALCIUM 20 MG/1
20 TABLET, COATED ORAL DAILY
Qty: 90 TABLET | Refills: 1 | Status: SHIPPED | OUTPATIENT
Start: 2024-02-29

## 2024-03-20 ENCOUNTER — PATIENT MESSAGE (OUTPATIENT)
Dept: PRIMARY CARE | Facility: CLINIC | Age: 54
End: 2024-03-20
Payer: COMMERCIAL

## 2024-03-20 DIAGNOSIS — F98.8 ATTENTION DEFICIT DISORDER, UNSPECIFIED HYPERACTIVITY PRESENCE: Primary | ICD-10-CM

## 2024-03-20 RX ORDER — METHYLPHENIDATE HYDROCHLORIDE 54 MG/1
54 TABLET ORAL EVERY MORNING
Qty: 30 TABLET | Refills: 0 | Status: SHIPPED | OUTPATIENT
Start: 2024-03-20 | End: 2024-04-22 | Stop reason: SDUPTHER

## 2024-03-25 ENCOUNTER — OFFICE VISIT (OUTPATIENT)
Dept: PRIMARY CARE | Facility: CLINIC | Age: 54
End: 2024-03-25
Payer: COMMERCIAL

## 2024-03-25 VITALS
DIASTOLIC BLOOD PRESSURE: 75 MMHG | TEMPERATURE: 97.3 F | WEIGHT: 182.8 LBS | HEIGHT: 70 IN | SYSTOLIC BLOOD PRESSURE: 120 MMHG | BODY MASS INDEX: 26.17 KG/M2 | HEART RATE: 64 BPM

## 2024-03-25 DIAGNOSIS — F98.8 ATTENTION DEFICIT DISORDER, UNSPECIFIED HYPERACTIVITY PRESENCE: Primary | ICD-10-CM

## 2024-03-25 PROCEDURE — 99213 OFFICE O/P EST LOW 20 MIN: CPT | Performed by: FAMILY MEDICINE

## 2024-03-25 NOTE — PROGRESS NOTES
Subjective   Patient ID: Adair Beasley is a 53 y.o. male who presents for Follow-up (Medication check ).    HPI   Follow up attention deaf disorder.  Sav[employer] program for [employer] no longer active.  Wanting to go back to [employer]..  Continues to feel benefit from taking medication with improved work performance, and.  OARRS:  Ban Mitchell MD on 3/25/2024 12:36 PM  I have personally reviewed the OARRS report for Adair Beasley. I have considered the risks of abuse, dependence, addiction and diversion and I believe that it is clinically appropriate for Adair Beasley to be prescribed this medication    Is the patient prescribed a combination of a benzodiazepine and opioid?  No    Last Urine Drug Screen / ordered today: Yes  Recent Results (from the past 8760 hour(s))   Amphetamine Confirm, Urine    Collection Time: 01/05/24  1:09 PM   Result Value Ref Range    Methamphetamine Quant, Ur <200 ng/mL    MDA, Urine <200 ng/mL    MDEA, Urine <200 ng/mL    Phentermine,Urine <200 ng/mL    Amphetamines,Urine <50 ng/mL    MDMA, Urine <200 ng/mL   Drug Screen, Urine With Reflex to Confirmation    Collection Time: 01/05/24  1:09 PM   Result Value Ref Range    Amphetamine Screen, Urine Presumptive Negative Presumptive Negative    Barbiturate Screen, Urine Presumptive Negative Presumptive Negative    Benzodiazepines Screen, Urine Presumptive Negative Presumptive Negative    Cannabinoid Screen, Urine Presumptive Negative Presumptive Negative    Cocaine Metabolite Screen, Urine Presumptive Negative Presumptive Negative    Fentanyl Screen, Urine Presumptive Negative Presumptive Negative    Opiate Screen, Urine Presumptive Negative Presumptive Negative    Oxycodone Screen, Urine Presumptive Negative Presumptive Negative    PCP Screen, Urine Presumptive Negative Presumptive Negative   Methylphenidate And Metabolite,Conf,Urine    Collection Time: 06/21/23  8:43 AM   Result Value Ref Range    Methylphenidate Urine Quantitative  "106.8 ng/mL    Ritalinic acid Urine >5000.0 ng/mL     Results are as expected.         Controlled Substance Agreement:  Date of the Last Agreement: 1/2024   Reviewed Controlled Substance Agreement including but not limited to the benefits, risks, and alternatives to treatment with a Controlled Substance medication(s).    Stimulants:   What is the patient's goal of therapy? Improve focus/concentration/task comletion     Is this being achieved with current treatment? Switching back to concerta    Activities of Daily Living:   Is your overall impression that this patient is benefiting (symptom reduction outweighs side effects) from stimulant therapy? Yes     1. Physical Functioning: Better  2. Family Relationship: Better  3. Social Relationship: Better  4. Mood: Better  5. Sleep Patterns: Same  6. Overall Function: Better    Review of Systems   Cardiovascular:  Negative for chest pain and palpitations.   Neurological:  Negative for light-headedness and headaches.   Psychiatric/Behavioral:  Negative for agitation and sleep disturbance.        Objective   /75 (BP Location: Left arm, Patient Position: Sitting, BP Cuff Size: Large adult)   Pulse 64   Temp 36.3 °C (97.3 °F)   Ht 1.778 m (5' 10\")   Wt 82.9 kg (182 lb 12.8 oz)   BMI 26.23 kg/m²     Physical Exam  Constitutional:       Appearance: Normal appearance. He is normal weight.   HENT:      Head: Atraumatic.   Eyes:      Extraocular Movements: Extraocular movements intact.   Cardiovascular:      Rate and Rhythm: Normal rate and regular rhythm.      Pulses: Normal pulses.      Heart sounds: Normal heart sounds. No murmur heard.  Pulmonary:      Effort: Pulmonary effort is normal.      Breath sounds: Normal breath sounds.   Musculoskeletal:      Cervical back: Neck supple.      Right lower leg: No edema.      Left lower leg: No edema.   Lymphadenopathy:      Cervical: No cervical adenopathy.   Skin:     General: Skin is warm and dry.   Neurological:      " General: No focal deficit present.      Mental Status: He is alert. Mental status is at baseline.   Psychiatric:         Mood and Affect: Mood normal.         Behavior: Behavior normal.         Thought Content: Thought content normal.         Judgment: Judgment normal.         Assessment/Plan   Diagnoses and all orders for this visit:  Attention deficit disorder, unspecified hyperactivity presence  Resume treatment with Concerta 54 mg daily.  He will call for the next 3 months of refills.  Other orders  -     Follow Up In Primary Care - Established  -     Follow Up In Primary Care - Established; Future

## 2024-03-31 ASSESSMENT — ENCOUNTER SYMPTOMS
HEADACHES: 0
PALPITATIONS: 0
SLEEP DISTURBANCE: 0
AGITATION: 0
LIGHT-HEADEDNESS: 0

## 2024-04-22 ENCOUNTER — PATIENT MESSAGE (OUTPATIENT)
Dept: PRIMARY CARE | Facility: CLINIC | Age: 54
End: 2024-04-22
Payer: COMMERCIAL

## 2024-04-22 DIAGNOSIS — F98.8 ATTENTION DEFICIT DISORDER, UNSPECIFIED HYPERACTIVITY PRESENCE: ICD-10-CM

## 2024-04-22 RX ORDER — METHYLPHENIDATE HYDROCHLORIDE 54 MG/1
54 TABLET ORAL EVERY MORNING
Qty: 30 TABLET | Refills: 0 | Status: SHIPPED | OUTPATIENT
Start: 2024-04-22 | End: 2024-05-20 | Stop reason: SDUPTHER

## 2024-05-20 ENCOUNTER — PATIENT MESSAGE (OUTPATIENT)
Dept: PRIMARY CARE | Facility: CLINIC | Age: 54
End: 2024-05-20
Payer: COMMERCIAL

## 2024-05-20 DIAGNOSIS — F98.8 ATTENTION DEFICIT DISORDER, UNSPECIFIED HYPERACTIVITY PRESENCE: ICD-10-CM

## 2024-05-20 RX ORDER — METHYLPHENIDATE HYDROCHLORIDE 54 MG/1
54 TABLET ORAL EVERY MORNING
Qty: 30 TABLET | Refills: 0 | Status: SHIPPED | OUTPATIENT
Start: 2024-05-20 | End: 2024-06-19

## 2024-06-26 ENCOUNTER — APPOINTMENT (OUTPATIENT)
Dept: PRIMARY CARE | Facility: CLINIC | Age: 54
End: 2024-06-26
Payer: COMMERCIAL

## 2024-06-26 VITALS
SYSTOLIC BLOOD PRESSURE: 131 MMHG | HEART RATE: 58 BPM | DIASTOLIC BLOOD PRESSURE: 84 MMHG | WEIGHT: 175 LBS | TEMPERATURE: 97.3 F | BODY MASS INDEX: 25.05 KG/M2 | HEIGHT: 70 IN

## 2024-06-26 DIAGNOSIS — F98.8 ATTENTION DEFICIT DISORDER, UNSPECIFIED HYPERACTIVITY PRESENCE: Primary | ICD-10-CM

## 2024-06-26 DIAGNOSIS — M62.838 MUSCLE SPASM OF RIGHT SHOULDER: ICD-10-CM

## 2024-06-26 PROCEDURE — 99213 OFFICE O/P EST LOW 20 MIN: CPT | Performed by: FAMILY MEDICINE

## 2024-06-26 PROCEDURE — 1036F TOBACCO NON-USER: CPT | Performed by: FAMILY MEDICINE

## 2024-06-26 RX ORDER — METHYLPHENIDATE HYDROCHLORIDE 54 MG/1
54 TABLET ORAL EVERY MORNING
Qty: 30 TABLET | Refills: 0 | Status: SHIPPED | OUTPATIENT
Start: 2024-06-26 | End: 2024-07-26

## 2024-06-26 RX ORDER — CYCLOBENZAPRINE HCL 10 MG
10 TABLET ORAL NIGHTLY PRN
Qty: 30 TABLET | Refills: 1 | Status: SHIPPED | OUTPATIENT
Start: 2024-06-26

## 2024-06-26 ASSESSMENT — PATIENT HEALTH QUESTIONNAIRE - PHQ9
SUM OF ALL RESPONSES TO PHQ9 QUESTIONS 1 AND 2: 0
2. FEELING DOWN, DEPRESSED OR HOPELESS: NOT AT ALL
1. LITTLE INTEREST OR PLEASURE IN DOING THINGS: NOT AT ALL

## 2024-07-22 DIAGNOSIS — F98.8 ATTENTION DEFICIT DISORDER, UNSPECIFIED HYPERACTIVITY PRESENCE: ICD-10-CM

## 2024-07-22 RX ORDER — METHYLPHENIDATE HYDROCHLORIDE 54 MG/1
54 TABLET ORAL EVERY MORNING
Qty: 30 TABLET | Refills: 0 | Status: SHIPPED | OUTPATIENT
Start: 2024-07-22 | End: 2024-08-21

## 2024-08-26 DIAGNOSIS — F98.8 ATTENTION DEFICIT DISORDER, UNSPECIFIED HYPERACTIVITY PRESENCE: ICD-10-CM

## 2024-08-26 RX ORDER — METHYLPHENIDATE HYDROCHLORIDE 54 MG/1
54 TABLET ORAL EVERY MORNING
Qty: 30 TABLET | Refills: 0 | Status: SHIPPED | OUTPATIENT
Start: 2024-08-26 | End: 2024-09-25

## 2024-09-30 ENCOUNTER — APPOINTMENT (OUTPATIENT)
Dept: PRIMARY CARE | Facility: CLINIC | Age: 54
End: 2024-09-30
Payer: COMMERCIAL

## 2024-09-30 VITALS
HEART RATE: 59 BPM | BODY MASS INDEX: 26.63 KG/M2 | SYSTOLIC BLOOD PRESSURE: 130 MMHG | HEIGHT: 70 IN | DIASTOLIC BLOOD PRESSURE: 81 MMHG | TEMPERATURE: 97.2 F | WEIGHT: 186 LBS

## 2024-09-30 DIAGNOSIS — Z00.00 WELL ADULT HEALTH CHECK: ICD-10-CM

## 2024-09-30 DIAGNOSIS — Z23 NEED FOR INFLUENZA VACCINATION: ICD-10-CM

## 2024-09-30 DIAGNOSIS — Z12.5 ENCOUNTER FOR SCREENING FOR MALIGNANT NEOPLASM OF PROSTATE: ICD-10-CM

## 2024-09-30 DIAGNOSIS — M62.838 MUSCLE SPASM OF RIGHT SHOULDER: ICD-10-CM

## 2024-09-30 DIAGNOSIS — E78.5 HYPERLIPIDEMIA, UNSPECIFIED HYPERLIPIDEMIA TYPE: ICD-10-CM

## 2024-09-30 DIAGNOSIS — M76.31 ILIOTIBIAL BAND SYNDROME OF RIGHT SIDE: ICD-10-CM

## 2024-09-30 DIAGNOSIS — F98.8 ATTENTION DEFICIT DISORDER, UNSPECIFIED HYPERACTIVITY PRESENCE: Primary | ICD-10-CM

## 2024-09-30 PROCEDURE — 99214 OFFICE O/P EST MOD 30 MIN: CPT | Performed by: FAMILY MEDICINE

## 2024-09-30 PROCEDURE — 90656 IIV3 VACC NO PRSV 0.5 ML IM: CPT | Performed by: FAMILY MEDICINE

## 2024-09-30 PROCEDURE — 3008F BODY MASS INDEX DOCD: CPT | Performed by: FAMILY MEDICINE

## 2024-09-30 PROCEDURE — 90471 IMMUNIZATION ADMIN: CPT | Performed by: FAMILY MEDICINE

## 2024-09-30 RX ORDER — CYCLOBENZAPRINE HCL 10 MG
10 TABLET ORAL NIGHTLY PRN
Qty: 30 TABLET | Refills: 1 | Status: SHIPPED | OUTPATIENT
Start: 2024-09-30

## 2024-09-30 RX ORDER — ROSUVASTATIN CALCIUM 20 MG/1
20 TABLET, COATED ORAL DAILY
Qty: 90 TABLET | Refills: 1 | Status: SHIPPED | OUTPATIENT
Start: 2024-09-30

## 2024-09-30 RX ORDER — METHYLPHENIDATE HYDROCHLORIDE 54 MG/1
54 TABLET ORAL EVERY MORNING
Qty: 30 TABLET | Refills: 0 | Status: SHIPPED | OUTPATIENT
Start: 2024-09-30 | End: 2024-10-30

## 2024-09-30 NOTE — PROGRESS NOTES
Subjective   Patient ID: Adair Beasley is a 54 y.o. male who presents for Follow-up (Med follow up with refills. ).    HPI   Here for follow-up and medication management.  1.  Attention deaf disorder-continues to do well on current dose of methylphenidate.  Takes most days per week.  Occasional break on a weekend day.  Continues to have improved focus, task completion, reduce distractibility with medication.  OARRS:  Ban Mitchell MD on 9/30/2024 12:43 PM  I have personally reviewed the OARRS report for Adair Beasley. I have considered the risks of abuse, dependence, addiction and diversion and I believe that it is clinically appropriate for Adair Beasley to be prescribed this medication    Is the patient prescribed a combination of a benzodiazepine and opioid?  No    Last Urine Drug Screen / ordered today: Yes  Recent Results (from the past 8760 hour(s))   Amphetamine Confirm, Urine    Collection Time: 01/05/24  1:09 PM   Result Value Ref Range    Methamphetamine Quant, Ur <200 ng/mL    MDA, Urine <200 ng/mL    MDEA, Urine <200 ng/mL    Phentermine,Urine <200 ng/mL    Amphetamines,Urine <50 ng/mL    MDMA, Urine <200 ng/mL   Drug Screen, Urine With Reflex to Confirmation    Collection Time: 01/05/24  1:09 PM   Result Value Ref Range    Amphetamine Screen, Urine Presumptive Negative Presumptive Negative    Barbiturate Screen, Urine Presumptive Negative Presumptive Negative    Benzodiazepines Screen, Urine Presumptive Negative Presumptive Negative    Cannabinoid Screen, Urine Presumptive Negative Presumptive Negative    Cocaine Metabolite Screen, Urine Presumptive Negative Presumptive Negative    Fentanyl Screen, Urine Presumptive Negative Presumptive Negative    Opiate Screen, Urine Presumptive Negative Presumptive Negative    Oxycodone Screen, Urine Presumptive Negative Presumptive Negative    PCP Screen, Urine Presumptive Negative Presumptive Negative     Results are as expected.         Controlled  "Substance Agreement:  Date of the Last Agreement: 1/2024  Reviewed Controlled Substance Agreement including but not limited to the benefits, risks, and alternatives to treatment with a Controlled Substance medication(s).    Stimulants:   What is the patient's goal of therapy? Improve organization, efficiency at work, task completion   Is this being achieved with current treatment? yes    Activities of Daily Living:   Is your overall impression that this patient is benefiting (symptom reduction outweighs side effects) from stimulant therapy? Yes     1. Physical Functioning: Better  2. Family Relationship: Better  3. Social Relationship: Better  4. Mood: Better  5. Sleep Patterns: Same  6. Overall Function: Better      2.  Hyperlipidemia-stable on statin therapy.  3.  History of low back pain/muscle spasm-currently asymptomatic, but has noted increased pain along the right lateral knee.  No locking or give way.  Review of Systems   Constitutional:  Negative for fatigue and unexpected weight change.   Respiratory:  Negative for cough and shortness of breath.    Cardiovascular:  Negative for chest pain, palpitations and leg swelling.   Musculoskeletal:  Positive for myalgias. Negative for gait problem.   Neurological:  Negative for headaches.       Objective   /81 (BP Location: Left arm, Patient Position: Sitting)   Pulse 59   Temp 36.2 °C (97.2 °F)   Ht 1.778 m (5' 10\")   Wt 84.4 kg (186 lb)   BMI 26.69 kg/m²     Physical Exam  Vitals reviewed.   Constitutional:       Appearance: Normal appearance. He is normal weight.   HENT:      Head: Normocephalic and atraumatic.      Mouth/Throat:      Mouth: Mucous membranes are moist.   Eyes:      Extraocular Movements: Extraocular movements intact.      Conjunctiva/sclera: Conjunctivae normal.   Cardiovascular:      Rate and Rhythm: Normal rate and regular rhythm.      Pulses: Normal pulses.      Heart sounds: Normal heart sounds. No murmur heard.  Pulmonary:      " Effort: Pulmonary effort is normal. No respiratory distress.      Breath sounds: Normal breath sounds.   Abdominal:      General: Bowel sounds are normal.      Palpations: Abdomen is soft.      Tenderness: There is no abdominal tenderness.   Musculoskeletal:      Cervical back: Neck supple.      Right hip: Normal.      Left hip: Normal.      Right upper leg: Tenderness present.      Left upper leg: Normal.      Right knee: Normal.      Left knee: Normal.      Right lower leg: No edema.      Left lower leg: No edema.      Comments: Mild discomfort with right sided Joshua's test and significant tension and moderate tenderness with palpation across the right IT band.   Lymphadenopathy:      Cervical: No cervical adenopathy.   Skin:     General: Skin is warm and dry.      Findings: No rash.   Neurological:      General: No focal deficit present.      Mental Status: He is alert.   Psychiatric:         Mood and Affect: Mood normal.         Behavior: Behavior normal.         Thought Content: Thought content normal.         Judgment: Judgment normal.         Assessment/Plan   Assessment & Plan  Attention deficit disorder, unspecified hyperactivity presence  Controlled substance agreement and urine drug screen up-to-date.  OARRS report reviewed.  Continues to have benefit from methylphenidate, refill submitted.  Orders:    methylphenidate ER 54 mg extended release tablet; Take 1 tablet (54 mg) by mouth once daily in the morning. Do not crush, chew, or split.    Hyperlipidemia, unspecified hyperlipidemia type  Continue rosuvastatin, check labs as ordered.  Orders:    CBC and Auto Differential; Future    Comprehensive Metabolic Panel; Future    Lipid Panel; Future    rosuvastatin (Crestor) 20 mg tablet; Take 1 tablet (20 mg) by mouth once daily.    Iliotibial band syndrome of right side  Reviewed stretching, as needed use of anti-inflammatories.       Encounter for screening for malignant neoplasm of prostate    Orders:     Prostate Specific Antigen; Future    Need for influenza vaccination    Orders:    Flu vaccine, trivalent, preservative free, age 6 months and greater (Fluraix/Fluzone/Flulaval)    Muscle spasm of right shoulder    Orders:    cyclobenzaprine (Flexeril) 10 mg tablet; Take 1 tablet (10 mg) by mouth as needed at bedtime for muscle spasms.    Well adult health check    Orders:    Comprehensive Metabolic Panel; Future

## 2024-09-30 NOTE — ASSESSMENT & PLAN NOTE
Orders:    cyclobenzaprine (Flexeril) 10 mg tablet; Take 1 tablet (10 mg) by mouth as needed at bedtime for muscle spasms.

## 2024-09-30 NOTE — ASSESSMENT & PLAN NOTE
Continue rosuvastatin, check labs as ordered.  Orders:    CBC and Auto Differential; Future    Comprehensive Metabolic Panel; Future    Lipid Panel; Future    rosuvastatin (Crestor) 20 mg tablet; Take 1 tablet (20 mg) by mouth once daily.

## 2024-09-30 NOTE — ASSESSMENT & PLAN NOTE
Controlled substance agreement and urine drug screen up-to-date.  OARRS report reviewed.  Continues to have benefit from methylphenidate, refill submitted.  Orders:    methylphenidate ER 54 mg extended release tablet; Take 1 tablet (54 mg) by mouth once daily in the morning. Do not crush, chew, or split.

## 2024-10-01 ASSESSMENT — ENCOUNTER SYMPTOMS
MYALGIAS: 1
PALPITATIONS: 0
FATIGUE: 0
HEADACHES: 0
SHORTNESS OF BREATH: 0
COUGH: 0
UNEXPECTED WEIGHT CHANGE: 0

## 2024-10-28 ENCOUNTER — PATIENT MESSAGE (OUTPATIENT)
Dept: PRIMARY CARE | Facility: CLINIC | Age: 54
End: 2024-10-28
Payer: COMMERCIAL

## 2024-10-28 DIAGNOSIS — F90.0 ATTENTION DEFICIT HYPERACTIVITY DISORDER (ADHD), PREDOMINANTLY INATTENTIVE TYPE: Primary | ICD-10-CM

## 2024-10-28 RX ORDER — METHYLPHENIDATE HYDROCHLORIDE 54 MG/1
54 TABLET ORAL EVERY MORNING
Qty: 30 TABLET | Refills: 0 | Status: SHIPPED | OUTPATIENT
Start: 2024-10-28 | End: 2024-11-27

## 2024-11-19 NOTE — PROGRESS NOTES
Subjective   Patient ID: Adair Beasley is a 54 y.o. male who presents for Follow-up (Follow up with med refill. ).    HPI   Here for follow-up attention deficit disorder.  Doing reasonably well on current dose of methylphenidate.  Takes it daily.  Typically gets at least 10 hours of symptom relief on medication.  No adverse effects.  Recent increase physical labor, doing a lot of yard work.  Using Flexeril more frequently due to back ache.  Responds well.    OARRS:  Ban Mitchell MD on 6/26/2024 10:15 AM  I have personally reviewed the OARRS report for Adair Beasley. I have considered the risks of abuse, dependence, addiction and diversion and I believe that it is clinically appropriate for Adair Beasley to be prescribed this medication    Is the patient prescribed a combination of a benzodiazepine and opioid?  No    Last Urine Drug Screen / ordered today: Yes  Recent Results (from the past 8760 hour(s))   Amphetamine Confirm, Urine    Collection Time: 01/05/24  1:09 PM   Result Value Ref Range    Methamphetamine Quant, Ur <200 ng/mL    MDA, Urine <200 ng/mL    MDEA, Urine <200 ng/mL    Phentermine,Urine <200 ng/mL    Amphetamines,Urine <50 ng/mL    MDMA, Urine <200 ng/mL   Drug Screen, Urine With Reflex to Confirmation    Collection Time: 01/05/24  1:09 PM   Result Value Ref Range    Amphetamine Screen, Urine Presumptive Negative Presumptive Negative    Barbiturate Screen, Urine Presumptive Negative Presumptive Negative    Benzodiazepines Screen, Urine Presumptive Negative Presumptive Negative    Cannabinoid Screen, Urine Presumptive Negative Presumptive Negative    Cocaine Metabolite Screen, Urine Presumptive Negative Presumptive Negative    Fentanyl Screen, Urine Presumptive Negative Presumptive Negative    Opiate Screen, Urine Presumptive Negative Presumptive Negative    Oxycodone Screen, Urine Presumptive Negative Presumptive Negative    PCP Screen, Urine Presumptive Negative Presumptive Negative  "    Results are as expected.         Controlled Substance Agreement:  Date of the Last Agreement: 1/2024  Reviewed Controlled Substance Agreement including but not limited to the benefits, risks, and alternatives to treatment with a Controlled Substance medication(s).    Stimulants:   What is the patient's goal of therapy?  Proved focus, concentration, reduce distractibility  Is this being achieved with current treatment?  Y    Activities of Daily Living:   Is your overall impression that this patient is benefiting (symptom reduction outweighs side effects) from stimulant therapy? Yes     1. Physical Functioning: Better  2. Family Relationship: Better  3. Social Relationship: Better  4. Mood: Better  5. Sleep Patterns: Same  6. Overall Function: Better    Review of Systems    Objective   /84 (BP Location: Left arm, Patient Position: Sitting)   Pulse 58   Temp 36.3 °C (97.3 °F)   Ht 1.778 m (5' 10\")   Wt 79.4 kg (175 lb)   BMI 25.11 kg/m²     Physical Exam  Constitutional:       Appearance: Normal appearance. He is normal weight.   HENT:      Head: Normocephalic and atraumatic.   Eyes:      Conjunctiva/sclera: Conjunctivae normal.   Cardiovascular:      Rate and Rhythm: Normal rate and regular rhythm.      Pulses: Normal pulses.      Heart sounds: Normal heart sounds. No murmur heard.  Pulmonary:      Effort: Pulmonary effort is normal. No respiratory distress.      Breath sounds: Normal breath sounds.   Musculoskeletal:      Cervical back: Normal range of motion and neck supple.      Right lower leg: No edema.      Left lower leg: No edema.   Lymphadenopathy:      Cervical: No cervical adenopathy.   Skin:     General: Skin is warm and dry.   Neurological:      General: No focal deficit present.      Mental Status: He is alert. Mental status is at baseline.   Psychiatric:         Mood and Affect: Mood normal.         Thought Content: Thought content normal.         Judgment: Judgment normal. "         Assessment/Plan   Diagnoses and all orders for this visit:  Attention deficit disorder, unspecified hyperactivity presence  -     methylphenidate ER 54 mg extended release tablet; Take 1 tablet (54 mg) by mouth once daily in the morning. Do not crush, chew, or split.  Muscle spasm of right shoulder  -     cyclobenzaprine (Flexeril) 10 mg tablet; Take 1 tablet (10 mg) by mouth as needed at bedtime for muscle spasms.  Other orders  -     Follow Up In Primary Care - Established  -     Follow Up In Primary Care - Established; Future          [FreeTextEntry1] : 78  F  Statin intolerance HTN CAD stent to Prox LAD 6/2020 mid LAD 20185 Uterine Cancer s/p resection and radiation, LBBB, stable sob, CAP Should replacement    was in [place] fell need shoulder replacement surgery she otherwise feels ok no new cardiac complaints     nuc perfusion 2/2021 normal perfusion resting EF 49% Post stress EF 54% CPEt 4/2021 vo2 83%  Echo EF normal 6/2023   ecg nsr LBBB 11/19/24

## 2024-11-30 ENCOUNTER — PATIENT MESSAGE (OUTPATIENT)
Dept: PRIMARY CARE | Facility: CLINIC | Age: 54
End: 2024-11-30
Payer: COMMERCIAL

## 2024-11-30 DIAGNOSIS — F90.0 ATTENTION DEFICIT HYPERACTIVITY DISORDER (ADHD), PREDOMINANTLY INATTENTIVE TYPE: ICD-10-CM

## 2024-12-02 RX ORDER — METHYLPHENIDATE HYDROCHLORIDE 54 MG/1
54 TABLET ORAL EVERY MORNING
Qty: 30 TABLET | Refills: 0 | Status: SHIPPED | OUTPATIENT
Start: 2024-12-02 | End: 2025-01-01

## 2024-12-30 ENCOUNTER — PATIENT MESSAGE (OUTPATIENT)
Dept: PRIMARY CARE | Facility: CLINIC | Age: 54
End: 2024-12-30
Payer: COMMERCIAL

## 2024-12-30 DIAGNOSIS — F90.0 ATTENTION DEFICIT HYPERACTIVITY DISORDER (ADHD), PREDOMINANTLY INATTENTIVE TYPE: ICD-10-CM

## 2024-12-31 RX ORDER — METHYLPHENIDATE HYDROCHLORIDE 54 MG/1
54 TABLET ORAL EVERY MORNING
Qty: 30 TABLET | Refills: 0 | Status: SHIPPED | OUTPATIENT
Start: 2024-12-31 | End: 2025-01-30

## 2025-01-22 ENCOUNTER — APPOINTMENT (OUTPATIENT)
Dept: PRIMARY CARE | Facility: CLINIC | Age: 55
End: 2025-01-22
Payer: COMMERCIAL

## 2025-01-22 DIAGNOSIS — M62.838 MUSCLE SPASM OF RIGHT SHOULDER: ICD-10-CM

## 2025-01-22 DIAGNOSIS — M25.531 RIGHT WRIST PAIN: ICD-10-CM

## 2025-01-22 DIAGNOSIS — Z12.5 SCREENING PSA (PROSTATE SPECIFIC ANTIGEN): ICD-10-CM

## 2025-01-22 DIAGNOSIS — F90.0 ATTENTION DEFICIT HYPERACTIVITY DISORDER (ADHD), PREDOMINANTLY INATTENTIVE TYPE: Primary | ICD-10-CM

## 2025-01-22 DIAGNOSIS — E78.5 HYPERLIPIDEMIA, UNSPECIFIED HYPERLIPIDEMIA TYPE: ICD-10-CM

## 2025-01-22 DIAGNOSIS — Z11.59 SCREENING FOR VIRAL DISEASE: ICD-10-CM

## 2025-01-22 DIAGNOSIS — Z13.29 SCREENING FOR THYROID DISORDER: ICD-10-CM

## 2025-01-22 PROCEDURE — 80360 METHYLPHENIDATE: CPT

## 2025-01-22 PROCEDURE — 99214 OFFICE O/P EST MOD 30 MIN: CPT | Performed by: FAMILY MEDICINE

## 2025-01-22 PROCEDURE — 80307 DRUG TEST PRSMV CHEM ANLYZR: CPT

## 2025-01-22 PROCEDURE — 3008F BODY MASS INDEX DOCD: CPT | Performed by: FAMILY MEDICINE

## 2025-01-22 RX ORDER — METHYLPHENIDATE HYDROCHLORIDE 54 MG/1
54 TABLET ORAL EVERY MORNING
Qty: 90 TABLET | Refills: 0 | Status: SHIPPED | OUTPATIENT
Start: 2025-01-28 | End: 2025-04-28

## 2025-01-22 RX ORDER — ROSUVASTATIN CALCIUM 20 MG/1
20 TABLET, COATED ORAL DAILY
Qty: 90 TABLET | Refills: 1 | Status: SHIPPED | OUTPATIENT
Start: 2025-01-22

## 2025-01-22 RX ORDER — CYCLOBENZAPRINE HCL 10 MG
10 TABLET ORAL NIGHTLY PRN
Qty: 90 TABLET | Refills: 0 | Status: SHIPPED | OUTPATIENT
Start: 2025-01-22

## 2025-01-22 ASSESSMENT — PATIENT HEALTH QUESTIONNAIRE - PHQ9
1. LITTLE INTEREST OR PLEASURE IN DOING THINGS: NOT AT ALL
2. FEELING DOWN, DEPRESSED OR HOPELESS: NOT AT ALL
SUM OF ALL RESPONSES TO PHQ9 QUESTIONS 1 AND 2: 0

## 2025-01-22 NOTE — PROGRESS NOTES
"Subjective   Patient ID: Adair Beasley is a 54 y.o. male who presents for Follow-up (Follow up. ).    HPI     Review of Systems    Objective   /88 (BP Location: Left arm, Patient Position: Sitting)   Pulse 67   Temp 36.3 °C (97.3 °F)   Ht 1.778 m (5' 10\")   Wt 85.7 kg (189 lb)   BMI 27.12 kg/m²     Physical Exam    Assessment/Plan   {Assess/PlanSmartLinks:32788}       " "Negative    PCP Screen, Urine Presumptive Negative Presumptive Negative    Methadone Screen, Urine Presumptive Negative Presumptive Negative     Results are as expected.         Controlled Substance Agreement:  Date of the Last Agreement: 01/22/2025  Reviewed Controlled Substance Agreement including but not limited to the benefits, risks, and alternatives to treatment with a Controlled Substance medication(s).    Stimulants:   What is the patient's goal of therapy?  Improve focus, concentration, reduce distractibility,  Is this being achieved with current treatment?  Yes    Activities of Daily Living:   Is your overall impression that this patient is benefiting (symptom reduction outweighs side effects) from stimulant therapy? Yes     1. Physical Functioning: Better  2. Family Relationship: Better  3. Social Relationship: Better  4. Mood: Better  5. Sleep Patterns: Same  6. Overall Function: Better    Review of Systems  Per hpi  Objective   /88 (BP Location: Left arm, Patient Position: Sitting)   Pulse 67   Temp 36.3 °C (97.3 °F)   Ht 1.778 m (5' 10\")   Wt 85.7 kg (189 lb)   BMI 27.12 kg/m²     Physical Exam  Vitals reviewed.   Constitutional:       General: He is not in acute distress.     Appearance: Normal appearance.   HENT:      Head: Normocephalic and atraumatic.      Right Ear: Tympanic membrane and ear canal normal.      Left Ear: Tympanic membrane and ear canal normal.      Nose: Nose normal. No congestion or rhinorrhea.      Mouth/Throat:      Mouth: Mucous membranes are moist.      Pharynx: Oropharynx is clear.   Eyes:      Extraocular Movements: Extraocular movements intact.      Conjunctiva/sclera: Conjunctivae normal.      Pupils: Pupils are equal, round, and reactive to light.   Cardiovascular:      Rate and Rhythm: Normal rate and regular rhythm.      Pulses: Normal pulses.      Heart sounds: Normal heart sounds. No murmur heard.  Pulmonary:      Effort: Pulmonary effort is normal. No " respiratory distress.      Breath sounds: Normal breath sounds.   Abdominal:      General: Abdomen is flat. Bowel sounds are normal.      Palpations: Abdomen is soft.      Tenderness: There is no abdominal tenderness.   Musculoskeletal:         General: Normal range of motion.      Right wrist: Swelling and tenderness present. Normal range of motion.      Cervical back: Normal range of motion and neck supple.   Lymphadenopathy:      Cervical: No cervical adenopathy.   Skin:     General: Skin is warm and dry.   Neurological:      General: No focal deficit present.      Mental Status: He is alert and oriented to person, place, and time.   Psychiatric:         Mood and Affect: Mood normal.         Thought Content: Thought content normal.         Assessment/Plan   Assessment & Plan  Screening for thyroid disorder    Orders:    TSH with reflex to Free T4 if abnormal; Future    Screening PSA (prostate specific antigen)    Orders:    Prostate Specific Antigen, Screen; Future    Screening for viral disease    Orders:    Hepatitis C Antibody; Future    Attention deficit hyperactivity disorder (ADHD), predominantly inattentive type  Controlled subs agreement updated, urine drug screen obtained, OARRS report reviewed.  Continue methylphenidate.  Orders:    Drug Screen, Urine With Reflex to Confirmation    Methylphenidate And Metabolite,Conf,Urine    CBC and Auto Differential; Future    methylphenidate ER 54 mg extended release tablet; Take 1 tablet (54 mg) by mouth once daily in the morning. Do not crush, chew, or split. Do not fill before January 28, 2025.    Hyperlipidemia, unspecified hyperlipidemia type  Lab work as ordered for surveillance of statin therapy.  Continue rosuvastatin.  Orders:    Lipid Panel; Future    Comprehensive Metabolic Panel; Future    TSH with reflex to Free T4 if abnormal; Future    rosuvastatin (Crestor) 20 mg tablet; Take 1 tablet (20 mg) by mouth once daily.    Right wrist pain  Obtain x-ray.  If  pain persists or abnormalities on x-ray, we discussed hand orthopedic referral  Orders:    XR wrist left 3+ views; Future    Muscle spasm of right shoulder  As needed cyclobenzaprine use for muscle spasm.  Reminded of potential sedating side effects.  Orders:    cyclobenzaprine (Flexeril) 10 mg tablet; Take 1 tablet (10 mg) by mouth as needed at bedtime for muscle spasms.

## 2025-01-23 LAB
AMPHETAMINES UR QL SCN: NORMAL
BARBITURATES UR QL SCN: NORMAL
BENZODIAZ UR QL SCN: NORMAL
BZE UR QL SCN: NORMAL
CANNABINOIDS UR QL SCN: NORMAL
FENTANYL+NORFENTANYL UR QL SCN: NORMAL
METHADONE UR QL SCN: NORMAL
OPIATES UR QL SCN: NORMAL
OXYCODONE+OXYMORPHONE UR QL SCN: NORMAL
PCP UR QL SCN: NORMAL

## 2025-01-25 VITALS
SYSTOLIC BLOOD PRESSURE: 120 MMHG | BODY MASS INDEX: 27.06 KG/M2 | WEIGHT: 189 LBS | DIASTOLIC BLOOD PRESSURE: 82 MMHG | TEMPERATURE: 97.3 F | HEART RATE: 67 BPM | HEIGHT: 70 IN

## 2025-01-25 NOTE — ASSESSMENT & PLAN NOTE
Controlled subs agreement updated, urine drug screen obtained, OARRS report reviewed.  Continue methylphenidate.  Orders:    Drug Screen, Urine With Reflex to Confirmation    Methylphenidate And Metabolite,Conf,Urine    CBC and Auto Differential; Future    methylphenidate ER 54 mg extended release tablet; Take 1 tablet (54 mg) by mouth once daily in the morning. Do not crush, chew, or split. Do not fill before January 28, 2025.

## 2025-01-25 NOTE — ASSESSMENT & PLAN NOTE
As needed cyclobenzaprine use for muscle spasm.  Reminded of potential sedating side effects.  Orders:    cyclobenzaprine (Flexeril) 10 mg tablet; Take 1 tablet (10 mg) by mouth as needed at bedtime for muscle spasms.

## 2025-01-25 NOTE — ASSESSMENT & PLAN NOTE
Lab work as ordered for surveillance of statin therapy.  Continue rosuvastatin.  Orders:    Lipid Panel; Future    Comprehensive Metabolic Panel; Future    TSH with reflex to Free T4 if abnormal; Future    rosuvastatin (Crestor) 20 mg tablet; Take 1 tablet (20 mg) by mouth once daily.

## 2025-01-28 ENCOUNTER — HOSPITAL ENCOUNTER (OUTPATIENT)
Dept: RADIOLOGY | Facility: CLINIC | Age: 55
Discharge: HOME | End: 2025-01-28
Payer: COMMERCIAL

## 2025-01-28 DIAGNOSIS — M25.531 RIGHT WRIST PAIN: ICD-10-CM

## 2025-01-28 LAB
ME-PHENIDATE UR-MCNC: 208.1 NG/ML
PPAA UR-MCNC: >5000 NG/ML

## 2025-01-28 PROCEDURE — 73110 X-RAY EXAM OF WRIST: CPT | Mod: RT

## 2025-01-28 PROCEDURE — 73110 X-RAY EXAM OF WRIST: CPT | Mod: RIGHT SIDE | Performed by: RADIOLOGY

## 2025-01-28 PROCEDURE — 73110 X-RAY EXAM OF WRIST: CPT | Mod: LT

## 2025-01-30 ENCOUNTER — TELEPHONE (OUTPATIENT)
Dept: PRIMARY CARE | Facility: CLINIC | Age: 55
End: 2025-01-30
Payer: COMMERCIAL

## 2025-01-30 ENCOUNTER — PATIENT MESSAGE (OUTPATIENT)
Dept: PRIMARY CARE | Facility: CLINIC | Age: 55
End: 2025-01-30
Payer: COMMERCIAL

## 2025-01-30 DIAGNOSIS — S62.124G: Primary | ICD-10-CM

## 2025-01-30 NOTE — TELEPHONE ENCOUNTER
Aware of results.  Will proceed with orthopedic consultation to facilitate further imaging and treatment.

## 2025-02-06 ENCOUNTER — OFFICE VISIT (OUTPATIENT)
Dept: ORTHOPEDIC SURGERY | Facility: CLINIC | Age: 55
End: 2025-02-06
Payer: COMMERCIAL

## 2025-02-06 ENCOUNTER — HOSPITAL ENCOUNTER (OUTPATIENT)
Dept: RADIOLOGY | Facility: CLINIC | Age: 55
Discharge: HOME | End: 2025-02-06
Payer: COMMERCIAL

## 2025-02-06 DIAGNOSIS — S62.124G: ICD-10-CM

## 2025-02-06 DIAGNOSIS — M92.211 OSTEOCHONDROSIS OF LUNATE OF RIGHT WRIST: Primary | ICD-10-CM

## 2025-02-06 DIAGNOSIS — E55.9 VITAMIN D INSUFFICIENCY: ICD-10-CM

## 2025-02-06 PROCEDURE — 99214 OFFICE O/P EST MOD 30 MIN: CPT | Performed by: ORTHOPAEDIC SURGERY

## 2025-02-06 PROCEDURE — 73110 X-RAY EXAM OF WRIST: CPT | Mod: RIGHT SIDE | Performed by: ORTHOPAEDIC SURGERY

## 2025-02-06 PROCEDURE — 73110 X-RAY EXAM OF WRIST: CPT | Mod: RT

## 2025-02-06 PROCEDURE — L3908 WHO COCK-UP NONMOLDE PRE OTS: HCPCS | Performed by: ORTHOPAEDIC SURGERY

## 2025-02-06 PROCEDURE — 1036F TOBACCO NON-USER: CPT | Performed by: ORTHOPAEDIC SURGERY

## 2025-02-06 NOTE — PROGRESS NOTES
History present illness: Patient presents today for evaluation of the right wrist.  He describes a slip and fall in October 2024.  History of right sided cervical radiculopathy.  No antecedent pain prior to his fall that occurred in October.  He describes diffuse dorsal right wrist pain.  History of low vitamin D but now does supplement with 5000 once daily.    Past medical history: The patient's past medical history, family history, social history, and review of systems were documented on the patient medical intake.  The updated data was reviewed in the electronic medical record.  History is negative except otherwise stated in history of present illness.        Physical examination:  General: Alert and oriented to person, place, and time.  No acute distress and breathing comfortably: Pleasant and cooperative with examination.  HEENT: Head is normocephalic and atraumatic.  Neck: Supple, no visible swelling.  Cardiovascular: No palpable tachycardia  Lungs: No audible wheezing or labored breathing  Abdomen: Nondistended.  Extremities:  Evaluation of the right upper extremity finds the patient had palpable radial artery at the wrist with brisk capillary refill to all digits.  Patient has intact sensation to axillary radial median and ulnar nerves.  There are no open wounds.  There are no signs of infection.  There is no evidence of lymphedema or lymphatic streaking.  The patient has supple compartments to right arm forearm and hand.  Discrete focal tenderness over dorsal aspect of lunate.    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  Recommendations were made for MRI to evaluate the lunate for osteonecrosis.  Certainly there is fragmentation and collapse.  Theoretically this could be consistent with the previous injury and this could  all be traumatic in origin.  It seems more likely however that whether the trauma provoked osteonecrosis and he saw rapid progression over time or whether osteonecrosis was antecedent to the fall this represents osteonecrosis lunate and likely we are looking at salvage procedures that could include proximal carpectomy versus limited wrist fusion.  Will check serum vitamin D in case fusion is the best option.  Follow-up after MRI to discuss findings and treatment options from there.  Patient is agreeable.        Procedure:

## 2025-02-22 LAB
ALBUMIN SERPL-MCNC: 4.2 G/DL (ref 3.6–5.1)
ALP SERPL-CCNC: 48 U/L (ref 35–144)
ALT SERPL-CCNC: 26 U/L (ref 9–46)
ANION GAP SERPL CALCULATED.4IONS-SCNC: 6 MMOL/L (CALC) (ref 7–17)
AST SERPL-CCNC: 21 U/L (ref 10–35)
BASOPHILS # BLD AUTO: 32 CELLS/UL (ref 0–200)
BASOPHILS NFR BLD AUTO: 0.7 %
BILIRUB SERPL-MCNC: 0.7 MG/DL (ref 0.2–1.2)
BUN SERPL-MCNC: 15 MG/DL (ref 7–25)
CALCIUM SERPL-MCNC: 9.2 MG/DL (ref 8.6–10.3)
CHLORIDE SERPL-SCNC: 103 MMOL/L (ref 98–110)
CHOLEST SERPL-MCNC: 158 MG/DL
CHOLEST/HDLC SERPL: 2.7 (CALC)
CO2 SERPL-SCNC: 29 MMOL/L (ref 20–32)
CREAT SERPL-MCNC: 0.89 MG/DL (ref 0.7–1.3)
EGFRCR SERPLBLD CKD-EPI 2021: 102 ML/MIN/1.73M2
EOSINOPHIL # BLD AUTO: 110 CELLS/UL (ref 15–500)
EOSINOPHIL NFR BLD AUTO: 2.4 %
ERYTHROCYTE [DISTWIDTH] IN BLOOD BY AUTOMATED COUNT: 12.7 % (ref 11–15)
GLUCOSE SERPL-MCNC: 102 MG/DL (ref 65–99)
HCT VFR BLD AUTO: 45.6 % (ref 38.5–50)
HCV AB SERPL QL IA: NORMAL
HDLC SERPL-MCNC: 59 MG/DL
HGB BLD-MCNC: 15.2 G/DL (ref 13.2–17.1)
LDLC SERPL CALC-MCNC: 85 MG/DL (CALC)
LYMPHOCYTES # BLD AUTO: 1320 CELLS/UL (ref 850–3900)
LYMPHOCYTES NFR BLD AUTO: 28.7 %
MCH RBC QN AUTO: 30.1 PG (ref 27–33)
MCHC RBC AUTO-ENTMCNC: 33.3 G/DL (ref 32–36)
MCV RBC AUTO: 90.3 FL (ref 80–100)
MONOCYTES # BLD AUTO: 290 CELLS/UL (ref 200–950)
MONOCYTES NFR BLD AUTO: 6.3 %
NEUTROPHILS # BLD AUTO: 2847 CELLS/UL (ref 1500–7800)
NEUTROPHILS NFR BLD AUTO: 61.9 %
NONHDLC SERPL-MCNC: 99 MG/DL (CALC)
PLATELET # BLD AUTO: 162 THOUSAND/UL (ref 140–400)
PMV BLD REES-ECKER: 11.9 FL (ref 7.5–12.5)
POTASSIUM SERPL-SCNC: 4.4 MMOL/L (ref 3.5–5.3)
PROT SERPL-MCNC: 6.2 G/DL (ref 6.1–8.1)
PSA SERPL-MCNC: 0.3 NG/ML
RBC # BLD AUTO: 5.05 MILLION/UL (ref 4.2–5.8)
SODIUM SERPL-SCNC: 138 MMOL/L (ref 135–146)
TRIGL SERPL-MCNC: 56 MG/DL
TSH SERPL-ACNC: 2.39 MIU/L (ref 0.4–4.5)
WBC # BLD AUTO: 4.6 THOUSAND/UL (ref 3.8–10.8)

## 2025-02-24 DIAGNOSIS — E55.9 VITAMIN D DEFICIENCY: Primary | ICD-10-CM

## 2025-02-25 LAB
25(OH)D3+25(OH)D2 SERPL-MCNC: 51 NG/ML (ref 30–100)
ALBUMIN SERPL-MCNC: 4.2 G/DL (ref 3.6–5.1)
ALP SERPL-CCNC: 48 U/L (ref 35–144)
ALT SERPL-CCNC: 26 U/L (ref 9–46)
ANION GAP SERPL CALCULATED.4IONS-SCNC: 6 MMOL/L (CALC) (ref 7–17)
AST SERPL-CCNC: 21 U/L (ref 10–35)
BASOPHILS # BLD AUTO: 32 CELLS/UL (ref 0–200)
BASOPHILS NFR BLD AUTO: 0.7 %
BILIRUB SERPL-MCNC: 0.7 MG/DL (ref 0.2–1.2)
BUN SERPL-MCNC: 15 MG/DL (ref 7–25)
CALCIUM SERPL-MCNC: 9.2 MG/DL (ref 8.6–10.3)
CHLORIDE SERPL-SCNC: 103 MMOL/L (ref 98–110)
CHOLEST SERPL-MCNC: 158 MG/DL
CHOLEST/HDLC SERPL: 2.7 (CALC)
CO2 SERPL-SCNC: 29 MMOL/L (ref 20–32)
CREAT SERPL-MCNC: 0.89 MG/DL (ref 0.7–1.3)
EGFRCR SERPLBLD CKD-EPI 2021: 102 ML/MIN/1.73M2
EOSINOPHIL # BLD AUTO: 110 CELLS/UL (ref 15–500)
EOSINOPHIL NFR BLD AUTO: 2.4 %
ERYTHROCYTE [DISTWIDTH] IN BLOOD BY AUTOMATED COUNT: 12.7 % (ref 11–15)
GLUCOSE SERPL-MCNC: 102 MG/DL (ref 65–99)
HCT VFR BLD AUTO: 45.6 % (ref 38.5–50)
HCV AB SERPL QL IA: NORMAL
HDLC SERPL-MCNC: 59 MG/DL
HGB BLD-MCNC: 15.2 G/DL (ref 13.2–17.1)
LDLC SERPL CALC-MCNC: 85 MG/DL (CALC)
LYMPHOCYTES # BLD AUTO: 1320 CELLS/UL (ref 850–3900)
LYMPHOCYTES NFR BLD AUTO: 28.7 %
MCH RBC QN AUTO: 30.1 PG (ref 27–33)
MCHC RBC AUTO-ENTMCNC: 33.3 G/DL (ref 32–36)
MCV RBC AUTO: 90.3 FL (ref 80–100)
MONOCYTES # BLD AUTO: 290 CELLS/UL (ref 200–950)
MONOCYTES NFR BLD AUTO: 6.3 %
NEUTROPHILS # BLD AUTO: 2847 CELLS/UL (ref 1500–7800)
NEUTROPHILS NFR BLD AUTO: 61.9 %
NONHDLC SERPL-MCNC: 99 MG/DL (CALC)
PLATELET # BLD AUTO: 162 THOUSAND/UL (ref 140–400)
PMV BLD REES-ECKER: 11.9 FL (ref 7.5–12.5)
POTASSIUM SERPL-SCNC: 4.4 MMOL/L (ref 3.5–5.3)
PROT SERPL-MCNC: 6.2 G/DL (ref 6.1–8.1)
PSA SERPL-MCNC: 0.3 NG/ML
RBC # BLD AUTO: 5.05 MILLION/UL (ref 4.2–5.8)
SODIUM SERPL-SCNC: 138 MMOL/L (ref 135–146)
TRIGL SERPL-MCNC: 56 MG/DL
TSH SERPL-ACNC: 2.39 MIU/L (ref 0.4–4.5)
WBC # BLD AUTO: 4.6 THOUSAND/UL (ref 3.8–10.8)

## 2025-02-27 ENCOUNTER — HOSPITAL ENCOUNTER (OUTPATIENT)
Dept: RADIOLOGY | Facility: CLINIC | Age: 55
Discharge: HOME | End: 2025-02-27
Payer: COMMERCIAL

## 2025-02-27 DIAGNOSIS — M92.211 OSTEOCHONDROSIS OF LUNATE OF RIGHT WRIST: ICD-10-CM

## 2025-02-27 PROCEDURE — 73221 MRI JOINT UPR EXTREM W/O DYE: CPT | Mod: RT

## 2025-03-04 ENCOUNTER — OFFICE VISIT (OUTPATIENT)
Dept: ORTHOPEDIC SURGERY | Facility: CLINIC | Age: 55
End: 2025-03-04
Payer: COMMERCIAL

## 2025-03-04 ENCOUNTER — PATIENT MESSAGE (OUTPATIENT)
Dept: PRIMARY CARE | Facility: CLINIC | Age: 55
End: 2025-03-04

## 2025-03-04 DIAGNOSIS — M92.211 OSTEOCHONDROSIS OF LUNATE OF RIGHT WRIST: Primary | ICD-10-CM

## 2025-03-04 DIAGNOSIS — F90.0 ATTENTION DEFICIT HYPERACTIVITY DISORDER (ADHD), PREDOMINANTLY INATTENTIVE TYPE: ICD-10-CM

## 2025-03-04 PROCEDURE — 99215 OFFICE O/P EST HI 40 MIN: CPT | Performed by: ORTHOPAEDIC SURGERY

## 2025-03-04 PROCEDURE — 1036F TOBACCO NON-USER: CPT | Performed by: ORTHOPAEDIC SURGERY

## 2025-03-04 NOTE — PROGRESS NOTES
History present illness: Patient presents today for evaluation of chronic right wrist pain.  MRI performed recently shows evidence for osteonecrosis lunate with collapse and fragmentation along with failure of scapholunate ligament.  Pain and dysfunction on a daily basis.  Vitamin D last checked came back at 51.  That was February.      Past medical history: The patient's past medical history, family history, social history, and review of systems were documented on the patient medical intake.  The updated data was reviewed in the electronic medical record.  History is negative except otherwise stated in history of present illness.        Physical examination:  General: Alert and oriented to person, place, and time.  No acute distress and breathing comfortably: Pleasant and cooperative with examination.  HEENT: Head is normocephalic and atraumatic.  Neck: Supple, no visible swelling.  Cardiovascular: No palpable tachycardia  Lungs: No audible wheezing or labored breathing  Abdomen: Nondistended.  Extremities: Evaluation of the right upper extremity finds the patient had palpable radial artery at the wrist with brisk capillary refill to all digits.  Patient has intact sensation to axillary radial median and ulnar nerves.  There are no open wounds.  There are no signs of infection.  There is no evidence of lymphedema or lymphatic streaking.  The patient has supple compartments to right arm forearm and hand.  Tenderness over dorsal aspect right wrist with stiffness through flexion extension arc.      Radiology:      Assessment: Right wrist chronic pain.  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      Plan: Treatment options were discussed.  We talked about operative and nonoperative strategies.  We talked about intraoperative techniques and postoperative protocols for right wrist excision terminal branch posterior interosseous nerve for purposes of  denervation and long-term pain control with concomitant proximal row carpectomy versus scaphoid capitate fusion.  Patient understands his options.  He elects to proceed forth with surgery as described above.  Plan for surgery under regional block anesthesia with sedation.        Procedure:

## 2025-03-05 RX ORDER — METHYLPHENIDATE HYDROCHLORIDE 54 MG/1
54 TABLET ORAL EVERY MORNING
Qty: 30 TABLET | Refills: 0 | Status: SHIPPED | OUTPATIENT
Start: 2025-03-05 | End: 2025-04-04

## 2025-04-01 ENCOUNTER — PATIENT MESSAGE (OUTPATIENT)
Dept: PRIMARY CARE | Facility: CLINIC | Age: 55
End: 2025-04-01
Payer: COMMERCIAL

## 2025-04-01 DIAGNOSIS — F90.0 ATTENTION DEFICIT HYPERACTIVITY DISORDER (ADHD), PREDOMINANTLY INATTENTIVE TYPE: ICD-10-CM

## 2025-04-01 RX ORDER — METHYLPHENIDATE HYDROCHLORIDE 54 MG/1
54 TABLET ORAL EVERY MORNING
Qty: 30 TABLET | Refills: 0 | Status: SHIPPED | OUTPATIENT
Start: 2025-04-01 | End: 2025-05-01

## 2025-04-30 ENCOUNTER — APPOINTMENT (OUTPATIENT)
Dept: PRIMARY CARE | Facility: CLINIC | Age: 55
End: 2025-04-30
Payer: COMMERCIAL

## 2025-04-30 VITALS
SYSTOLIC BLOOD PRESSURE: 135 MMHG | BODY MASS INDEX: 27.06 KG/M2 | WEIGHT: 189 LBS | HEIGHT: 70 IN | TEMPERATURE: 97.3 F | HEART RATE: 62 BPM | DIASTOLIC BLOOD PRESSURE: 85 MMHG

## 2025-04-30 DIAGNOSIS — Z12.11 ENCOUNTER FOR SCREENING FOR MALIGNANT NEOPLASM OF COLON: ICD-10-CM

## 2025-04-30 DIAGNOSIS — F90.0 ATTENTION DEFICIT HYPERACTIVITY DISORDER (ADHD), PREDOMINANTLY INATTENTIVE TYPE: Primary | ICD-10-CM

## 2025-04-30 DIAGNOSIS — E78.2 MIXED HYPERLIPIDEMIA: ICD-10-CM

## 2025-04-30 PROCEDURE — 99213 OFFICE O/P EST LOW 20 MIN: CPT | Performed by: FAMILY MEDICINE

## 2025-04-30 PROCEDURE — 3008F BODY MASS INDEX DOCD: CPT | Performed by: FAMILY MEDICINE

## 2025-04-30 RX ORDER — METHYLPHENIDATE HYDROCHLORIDE 54 MG/1
54 TABLET ORAL EVERY MORNING
Qty: 30 TABLET | Refills: 0 | Status: SHIPPED | OUTPATIENT
Start: 2025-05-02 | End: 2025-06-01

## 2025-04-30 ASSESSMENT — PATIENT HEALTH QUESTIONNAIRE - PHQ9
1. LITTLE INTEREST OR PLEASURE IN DOING THINGS: NOT AT ALL
SUM OF ALL RESPONSES TO PHQ9 QUESTIONS 1 AND 2: 0
2. FEELING DOWN, DEPRESSED OR HOPELESS: NOT AT ALL

## 2025-04-30 NOTE — PROGRESS NOTES
Subjective   Patient ID: Adair Beasley is a 55 y.o. male who presents for Follow-up (Follow up meds. ).    HPI   Hyperlipidemia-stable on rosuvastatin.  Tolerates without side effect.  No chest pain, palpitations, dyspnea on exertion  ADHD: Stable on current dose of methylphenidate.  Continues to feel benefit with medication  OARRS:  Ban Mitchell MD on 4/30/2025  1:12 PM  I have personally reviewed the OARRS report for Adair Beasley. I have considered the risks of abuse, dependence, addiction and diversion and I believe that it is clinically appropriate for Adair Beasley to be prescribed this medication    Is the patient prescribed a combination of a benzodiazepine and opioid?  No    Last Urine Drug Screen / ordered today: Yes  Recent Results (from the past 8760 hours)   Methylphenidate And Metabolite,Conf,Urine    Collection Time: 01/22/25 12:47 PM   Result Value Ref Range    Ritalinic acid Urine >5000.0 ng/mL    Methylphenidate Urine Quantitative 208.1 ng/mL   Drug Screen, Urine With Reflex to Confirmation    Collection Time: 01/22/25 12:47 PM   Result Value Ref Range    Amphetamine Screen, Urine Presumptive Negative Presumptive Negative    Barbiturate Screen, Urine Presumptive Negative Presumptive Negative    Benzodiazepines Screen, Urine Presumptive Negative Presumptive Negative    Cannabinoid Screen, Urine Presumptive Negative Presumptive Negative    Cocaine Metabolite Screen, Urine Presumptive Negative Presumptive Negative    Fentanyl Screen, Urine Presumptive Negative Presumptive Negative    Opiate Screen, Urine Presumptive Negative Presumptive Negative    Oxycodone Screen, Urine Presumptive Negative Presumptive Negative    PCP Screen, Urine Presumptive Negative Presumptive Negative    Methadone Screen, Urine Presumptive Negative Presumptive Negative     Results are as expected.         Controlled Substance Agreement:  Date of the Last Agreement: 1/22/2025  Reviewed Controlled Substance  "Agreement including but not limited to the benefits, risks, and alternatives to treatment with a Controlled Substance medication(s).    Stimulants:   What is the patient's goal of therapy?  Improve focus, concentration, task completion  Is this being achieved with current treatment?  Yes    Activities of Daily Living:   Is your overall impression that this patient is benefiting (symptom reduction outweighs side effects) from stimulant therapy? Yes     1. Physical Functioning: Better  2. Family Relationship: Better  3. Social Relationship: Better  4. Mood: Better  5. Sleep Patterns: Same  6. Overall Function: Better    Review of Systems    Objective   /85 (BP Location: Left arm, Patient Position: Sitting)   Pulse 62   Temp 36.3 °C (97.3 °F)   Ht 1.778 m (5' 10\")   Wt 85.7 kg (189 lb)   BMI 27.12 kg/m²     Physical Exam  Vitals reviewed.   Constitutional:       Appearance: Normal appearance. He is normal weight.   HENT:      Head: Normocephalic and atraumatic.      Mouth/Throat:      Mouth: Mucous membranes are moist.   Eyes:      Extraocular Movements: Extraocular movements intact.      Conjunctiva/sclera: Conjunctivae normal.   Cardiovascular:      Rate and Rhythm: Normal rate and regular rhythm.      Pulses: Normal pulses.      Heart sounds: Normal heart sounds. No murmur heard.  Pulmonary:      Effort: Pulmonary effort is normal. No respiratory distress.      Breath sounds: Normal breath sounds.   Abdominal:      General: Bowel sounds are normal.      Palpations: Abdomen is soft.      Tenderness: There is no abdominal tenderness.   Musculoskeletal:      Cervical back: Neck supple.      Right lower leg: No edema.      Left lower leg: No edema.   Lymphadenopathy:      Cervical: No cervical adenopathy.   Skin:     General: Skin is warm and dry.      Findings: No rash.   Neurological:      General: No focal deficit present.      Mental Status: He is alert.   Psychiatric:         Mood and Affect: Mood " normal.         Behavior: Behavior normal.         Thought Content: Thought content normal.         Judgment: Judgment normal.         Assessment/Plan   Assessment & Plan  Attention deficit hyperactivity disorder (ADHD), predominantly inattentive type  Continue methylphenidate current dose  Orders:    methylphenidate ER (Concerta) 54 mg extended release tablet; Take 1 tablet (54 mg) by mouth once daily in the morning. Do not crush, chew, or split. Do not fill before May 2, 2025.    Encounter for screening for malignant neoplasm of colon  Due for follow-up colon cancer screening.  Agreeable to proceed with colonoscopy at present.  Referral entered.  Orders:    Colonoscopy Screening; Average Risk Patient; Future    Mixed hyperlipidemia  Continue statin

## 2025-05-03 NOTE — ASSESSMENT & PLAN NOTE
Continue methylphenidate current dose  Orders:    methylphenidate ER (Concerta) 54 mg extended release tablet; Take 1 tablet (54 mg) by mouth once daily in the morning. Do not crush, chew, or split. Do not fill before May 2, 2025.

## 2025-05-06 DIAGNOSIS — G89.18 POST-OP PAIN: Primary | ICD-10-CM

## 2025-05-06 RX ORDER — OXYCODONE AND ACETAMINOPHEN 5; 325 MG/1; MG/1
1 TABLET ORAL EVERY 8 HOURS PRN
Qty: 20 TABLET | Refills: 0 | Status: SHIPPED | OUTPATIENT
Start: 2025-05-06 | End: 2025-05-13

## 2025-05-07 PROCEDURE — 14040 TIS TRNFR F/C/C/M/N/A/G/H/F: CPT | Performed by: ORTHOPAEDIC SURGERY

## 2025-05-07 PROCEDURE — 64772 INCISION OF SPINAL NERVE: CPT | Performed by: ORTHOPAEDIC SURGERY

## 2025-05-07 PROCEDURE — 25825 ARTHRD WRIST WITH AUTOGRAFT: CPT | Performed by: PHYSICIAN ASSISTANT

## 2025-05-07 PROCEDURE — 64772 INCISION OF SPINAL NERVE: CPT | Performed by: PHYSICIAN ASSISTANT

## 2025-05-07 PROCEDURE — 25825 ARTHRD WRIST WITH AUTOGRAFT: CPT | Performed by: ORTHOPAEDIC SURGERY

## 2025-05-20 ENCOUNTER — HOSPITAL ENCOUNTER (OUTPATIENT)
Dept: RADIOLOGY | Facility: CLINIC | Age: 55
Discharge: HOME | End: 2025-05-20
Payer: COMMERCIAL

## 2025-05-20 ENCOUNTER — APPOINTMENT (OUTPATIENT)
Dept: ORTHOPEDIC SURGERY | Facility: CLINIC | Age: 55
End: 2025-05-20
Payer: COMMERCIAL

## 2025-05-20 ENCOUNTER — OFFICE VISIT (OUTPATIENT)
Dept: ORTHOPEDIC SURGERY | Facility: CLINIC | Age: 55
End: 2025-05-20
Payer: COMMERCIAL

## 2025-05-20 DIAGNOSIS — M92.211 OSTEOCHONDROSIS OF LUNATE OF RIGHT WRIST: ICD-10-CM

## 2025-05-20 DIAGNOSIS — M92.211 OSTEOCHONDROSIS OF LUNATE OF RIGHT WRIST: Primary | ICD-10-CM

## 2025-05-20 PROCEDURE — 29085 APPL CAST HAND&LWR FOREARM: CPT | Performed by: ORTHOPAEDIC SURGERY

## 2025-05-20 PROCEDURE — 99213 OFFICE O/P EST LOW 20 MIN: CPT | Mod: 25 | Performed by: ORTHOPAEDIC SURGERY

## 2025-05-20 PROCEDURE — 99024 POSTOP FOLLOW-UP VISIT: CPT | Performed by: ORTHOPAEDIC SURGERY

## 2025-05-20 PROCEDURE — 73110 X-RAY EXAM OF WRIST: CPT | Mod: RIGHT SIDE | Performed by: ORTHOPAEDIC SURGERY

## 2025-05-20 PROCEDURE — 73110 X-RAY EXAM OF WRIST: CPT | Mod: RT

## 2025-05-20 PROCEDURE — 1036F TOBACCO NON-USER: CPT | Performed by: ORTHOPAEDIC SURGERY

## 2025-05-20 NOTE — PROGRESS NOTES
5/20/2025    Chief Complaint   Patient presents with    Right Wrist - Post-op     4 corner fusion  DOS: 5/7/25  Xrays today        History of Present Illness:  Patient Adair Beasley , 55 y.o. male, presents today, 5/20/2025, for evaluation of right wrist scaphocapitate fusion with excision lunate, 2 weeks postop.  He is done well overall in the interim since surgery.  Minimal to moderate soreness discomfort.  He has been immobilized in his postop splint keeping this intact.  Denies any fevers, chills, constitutional symptoms..         Review of Systems:   GENERAL: Negative  GI: Negative  MUSCULOSKELETAL: See HPI  SKIN: Negative  NEURO:  Negative     Physical Exam:  GENERAL:  Alert and oriented to person, place, and time.  No acute distress and breathing comfortably; pleasant and cooperative with the examination.  HEENT:  Head is normocephalic and atraumatic.  NECK:  Supple, no visible swelling.  CARDIOVASCULAR:  No palpable tachycardia.  LUNGS:  No audible wheezing or labored breathing.  ABDOMEN:  Nondistended.  Extremities: The surgical incision is clean, dry, intact, and appears to be healing well.  No active bleeding, erythema, warmth, drainage, or signs of infection.  Appropriate functional ROM demonstrated with flexion/extension of the digits, and flexion/extension/pronosupination of the wrist.     Imaging/Test Results:  3 views of the wrist show evidence of excision of the lunate of the right wrist.  Indwelling hardware for scaphocapitate fusion shows continued adequate alignment.  No evidence of failure or migration.     Assessment:  Right wrist give capitate fusion with excision lunate, 2 weeks postop.     Plan:  Sutures were removed in the office today.  Transition to short arm cast for immobilization.  The patient will have restrictions of strict nonweightbearing to the right upper extremity.  We discussed and reviewed home exercise program for range of motion recovery, scar massage, and  desensitization techniques.  The patient will follow up with our office in 4 weeks, repeat x-rays, 3 views of right wrist out of cast at that visit.  All patient questions answered at today's visit.    In a face to face encounter, I performed a history and physical examination, discussed pertinent diagnostic studies if indicated, and discussed diagnosis and management strategies with both the patient and the mid-level provider. I reviewed the mid-level's note and agree with the documented findings and plan of care.  Patient presents status post right wrist excision lunate with scaphoid capitate fusion.  Wound looks good.  X-rays look good.  He was placed into a cast.  4-week follow-up for x-rays of right wrist out of cast.  Fiberglass casting material was used to fabricate well-padded well molded right short arm cast including base of thumb.

## 2025-06-06 ENCOUNTER — PATIENT MESSAGE (OUTPATIENT)
Dept: PRIMARY CARE | Facility: CLINIC | Age: 55
End: 2025-06-06
Payer: COMMERCIAL

## 2025-06-06 DIAGNOSIS — E78.5 HYPERLIPIDEMIA, UNSPECIFIED HYPERLIPIDEMIA TYPE: ICD-10-CM

## 2025-06-06 RX ORDER — ROSUVASTATIN CALCIUM 20 MG/1
20 TABLET, COATED ORAL DAILY
Qty: 90 TABLET | Refills: 1 | Status: SHIPPED | OUTPATIENT
Start: 2025-06-06

## 2025-06-09 ENCOUNTER — PATIENT MESSAGE (OUTPATIENT)
Dept: PRIMARY CARE | Facility: CLINIC | Age: 55
End: 2025-06-09
Payer: COMMERCIAL

## 2025-06-09 DIAGNOSIS — F90.0 ATTENTION DEFICIT HYPERACTIVITY DISORDER (ADHD), PREDOMINANTLY INATTENTIVE TYPE: ICD-10-CM

## 2025-06-09 RX ORDER — METHYLPHENIDATE HYDROCHLORIDE 54 MG/1
54 TABLET ORAL EVERY MORNING
Qty: 30 TABLET | Refills: 0 | Status: SHIPPED | OUTPATIENT
Start: 2025-06-09 | End: 2025-07-09

## 2025-06-19 ENCOUNTER — OFFICE VISIT (OUTPATIENT)
Dept: ORTHOPEDIC SURGERY | Facility: CLINIC | Age: 55
End: 2025-06-19
Payer: COMMERCIAL

## 2025-06-19 ENCOUNTER — HOSPITAL ENCOUNTER (OUTPATIENT)
Dept: RADIOLOGY | Facility: CLINIC | Age: 55
Discharge: HOME | End: 2025-06-19
Payer: COMMERCIAL

## 2025-06-19 DIAGNOSIS — M92.211 OSTEOCHONDROSIS OF LUNATE OF RIGHT WRIST: ICD-10-CM

## 2025-06-19 DIAGNOSIS — M92.211 OSTEOCHONDROSIS OF LUNATE OF RIGHT WRIST: Primary | ICD-10-CM

## 2025-06-19 PROCEDURE — 99024 POSTOP FOLLOW-UP VISIT: CPT | Performed by: ORTHOPAEDIC SURGERY

## 2025-06-19 PROCEDURE — 73110 X-RAY EXAM OF WRIST: CPT | Mod: RIGHT SIDE | Performed by: ORTHOPAEDIC SURGERY

## 2025-06-19 PROCEDURE — 73110 X-RAY EXAM OF WRIST: CPT | Mod: RT

## 2025-06-19 PROCEDURE — 99212 OFFICE O/P EST SF 10 MIN: CPT | Performed by: ORTHOPAEDIC SURGERY

## 2025-06-19 PROCEDURE — 1036F TOBACCO NON-USER: CPT | Performed by: ORTHOPAEDIC SURGERY

## 2025-06-19 NOTE — PROGRESS NOTES
6/19/2025        History of Present Illness:  Patient Adair Beasley , 55 y.o. male, presents today, 6/19/2025, for evaluation of right wrist scaphocapitate fusion, 6 weeks postop.  He has been immobilized in short arm cast and compliant with nonweightbearing restrictions since last visit.  Working gentle finger motion recovery.         Review of Systems:   GENERAL: Negative  GI: Negative  MUSCULOSKELETAL: See HPI  SKIN: Negative  NEURO:  Negative     Physical Exam:  GENERAL:  Alert and oriented to person, place, and time.  No acute distress and breathing comfortably; pleasant and cooperative with the examination.  HEENT:  Head is normocephalic and atraumatic.  NECK:  Supple, no visible swelling.  CARDIOVASCULAR:  No palpable tachycardia.  LUNGS:  No audible wheezing or labored breathing.  ABDOMEN:  Nondistended.  Extremities: Evaluation of the right upper extremity finds the patient to have a palpable radial artery at the wrist with brisk capillary refill to all digits. The patient has intact sensorium to axillary, radial, median and ulnar nerves. There are no open wounds. There are no signs of infection. There is no evidence of lymphedema or lymphatic streaking. The patient has supple compartments of the right arm, forearm and hand.  Surgical incision is well-healed.     Imaging/Test Results:  Radiographs of the wrist show evidence of excision of lunate with scaphoid capitate fusion construct showing continued good alignment.  No evidence of hardware failure migration.  Signs of early healing and callus summation across the fusion site is noted.     Assessment:  Right wrist scaphocapitate fusion, 6 weeks postop.     Plan:  Continue with nonweightbearing the right upper extremity.  Transition removal brace coming in today for hygiene and gentle range of motion.  Work gentle home program for motion recovery to digits and wrist.  Follow-up again in 4 weeks for repeat clinical and radiographic exam, x-rays 3  views right wrist upon return.  All questions answered today's visit.    Sabine Charles PA-C

## 2025-07-01 ENCOUNTER — PATIENT MESSAGE (OUTPATIENT)
Dept: PRIMARY CARE | Facility: CLINIC | Age: 55
End: 2025-07-01
Payer: COMMERCIAL

## 2025-07-01 DIAGNOSIS — F90.0 ATTENTION DEFICIT HYPERACTIVITY DISORDER (ADHD), PREDOMINANTLY INATTENTIVE TYPE: ICD-10-CM

## 2025-07-01 RX ORDER — METHYLPHENIDATE HYDROCHLORIDE 54 MG/1
54 TABLET ORAL EVERY MORNING
Qty: 30 TABLET | Refills: 0 | Status: SHIPPED | OUTPATIENT
Start: 2025-07-03 | End: 2025-08-02

## 2025-07-17 ENCOUNTER — OFFICE VISIT (OUTPATIENT)
Dept: ORTHOPEDIC SURGERY | Facility: CLINIC | Age: 55
End: 2025-07-17
Payer: COMMERCIAL

## 2025-07-17 ENCOUNTER — HOSPITAL ENCOUNTER (OUTPATIENT)
Dept: RADIOLOGY | Facility: CLINIC | Age: 55
Discharge: HOME | End: 2025-07-17
Payer: COMMERCIAL

## 2025-07-17 DIAGNOSIS — M92.211 OSTEOCHONDROSIS OF LUNATE OF RIGHT WRIST: ICD-10-CM

## 2025-07-17 DIAGNOSIS — M92.211 OSTEOCHONDROSIS OF LUNATE OF RIGHT WRIST: Primary | ICD-10-CM

## 2025-07-17 PROCEDURE — 99212 OFFICE O/P EST SF 10 MIN: CPT | Performed by: ORTHOPAEDIC SURGERY

## 2025-07-17 PROCEDURE — 99024 POSTOP FOLLOW-UP VISIT: CPT | Performed by: ORTHOPAEDIC SURGERY

## 2025-07-17 PROCEDURE — 73110 X-RAY EXAM OF WRIST: CPT | Mod: RT

## 2025-07-17 PROCEDURE — 1036F TOBACCO NON-USER: CPT | Performed by: ORTHOPAEDIC SURGERY

## 2025-07-17 NOTE — PROGRESS NOTES
Patient presents today for evaluation status post right wrist excision lunate with scaphoid capitate fusion.  The patient reports compliance with activity restriction and immobilization.  X-rays demonstrate potential evidence for callus formation with spot welding through the central zone of intended fusion with concerning signs for unstable hardware in distal pole scaphoid.  He is nontender across the zone of intended fusion.  Treatment options were discussed.  Recommendations were made for very strict nonweightbearing to the right hand and persistence of the splint immobilization at all times except when coming out for bathing.  Follow-up with me in the office in 4 weeks.  Plan for thin cut CT of the right wrist to evaluate for fusion versus failure of fusion at scaphoid capitate interface to be done approximately 3-1/2 weeks from now 2 or 3 days before he comes to see me in the office.  Patient is agreeable with this strategy.  No x-rays upon return.  Review CT at that time.

## 2025-07-31 ENCOUNTER — APPOINTMENT (OUTPATIENT)
Dept: PRIMARY CARE | Facility: CLINIC | Age: 55
End: 2025-07-31
Payer: COMMERCIAL

## 2025-07-31 VITALS
HEART RATE: 61 BPM | WEIGHT: 191 LBS | DIASTOLIC BLOOD PRESSURE: 79 MMHG | SYSTOLIC BLOOD PRESSURE: 125 MMHG | HEIGHT: 70 IN | BODY MASS INDEX: 27.35 KG/M2 | TEMPERATURE: 96.6 F

## 2025-07-31 DIAGNOSIS — E78.2 MIXED HYPERLIPIDEMIA: ICD-10-CM

## 2025-07-31 DIAGNOSIS — Z00.00 ADULT GENERAL MEDICAL EXAM: Primary | ICD-10-CM

## 2025-07-31 DIAGNOSIS — F90.0 ATTENTION DEFICIT HYPERACTIVITY DISORDER (ADHD), PREDOMINANTLY INATTENTIVE TYPE: ICD-10-CM

## 2025-07-31 DIAGNOSIS — Z13.5 SCREENING FOR EYE CONDITION: ICD-10-CM

## 2025-07-31 PROCEDURE — 99396 PREV VISIT EST AGE 40-64: CPT | Performed by: FAMILY MEDICINE

## 2025-07-31 PROCEDURE — 3008F BODY MASS INDEX DOCD: CPT | Performed by: FAMILY MEDICINE

## 2025-07-31 RX ORDER — METHYLPHENIDATE HYDROCHLORIDE 54 MG/1
54 TABLET ORAL EVERY MORNING
Qty: 30 TABLET | Refills: 0 | Status: SHIPPED | OUTPATIENT
Start: 2025-08-04 | End: 2025-09-03

## 2025-07-31 NOTE — PROGRESS NOTES
Subjective   Patient ID: Adair Beasley is a 55 y.o. male who presents for Annual Exam (awv).    HPI   Nutrition: overall balanced; adequate fruits/vegetables.  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  Recovery has been slow.  He has follow-up imaging next month.  Hyperlipidemia-stable on statin therapy.  Tolerates without GI side effect or myalgia.  ADHD-doing well on current dose of methylphenidate.  [place]:  Ban Mitchell MD on 7/31/2025 11:04 AM  I have personally reviewed the OARRS report for Adair Beasley. I have considered the risks of abuse, dependence, addiction and diversion and I believe that it is clinically appropriate for Adair Beasley to be prescribed this medication    Is the patient prescribed a combination of a benzodiazepine and opioid?  No    Last Urine Drug Screen / ordered today: Yes  Recent Results (from the past 8760 hours)   Methylphenidate And Metabolite,Conf,Urine    Collection Time: 01/22/25 12:47 PM   Result Value Ref Range    Ritalinic acid Urine >5000.0 ng/mL    Methylphenidate Urine Quantitative 208.1 ng/mL   Drug Screen, Urine With Reflex to Confirmation    Collection Time: 01/22/25 12:47 PM   Result Value Ref Range    Amphetamine Screen, Urine Presumptive Negative Presumptive Negative    Barbiturate Screen, Urine Presumptive Negative Presumptive Negative    Benzodiazepines Screen, Urine Presumptive Negative Presumptive Negative    Cannabinoid Screen, Urine Presumptive Negative Presumptive Negative    Cocaine Metabolite Screen, Urine Presumptive Negative Presumptive Negative    Fentanyl Screen, Urine Presumptive Negative Presumptive Negative    Opiate Screen, Urine Presumptive Negative Presumptive Negative    Oxycodone Screen, Urine Presumptive Negative Presumptive Negative  "   PCP Screen, Urine Presumptive Negative Presumptive Negative    Methadone Screen, Urine Presumptive Negative Presumptive Negative     Results are as expected.           Controlled Substance Agreement:  Date of the Last Agreement: 1/22/2025  Reviewed Controlled Substance Agreement including but not limited to the benefits, risks, and alternatives to treatment with a Controlled Substance medication(s).    Stimulants:   What is the patient's goal of therapy? Improve focus/reduce distractability   Is this being achieved with current treatment? yes    Activities of Daily Living:   Is your overall impression that this patient is benefiting (symptom reduction outweighs side effects) from stimulant therapy? Yes     1. Physical Functioning: Better  2. Family Relationship: Better  3. Social Relationship: Better  4. Mood: Better  5. Sleep Patterns: Same  6. Overall Function: Better    Review of Systems   Respiratory:  Negative for cough, chest tightness and shortness of breath.    Cardiovascular:  Negative for chest pain and palpitations.   Gastrointestinal:  Negative for abdominal pain, constipation and diarrhea.   Musculoskeletal:  Negative for back pain.   Psychiatric/Behavioral:  Negative for dysphoric mood. The patient is not nervous/anxious.        Objective   /79 (BP Location: Left arm, Patient Position: Sitting)   Pulse 61   Temp 35.9 °C (96.6 °F)   Ht 1.778 m (5' 10\")   Wt 86.6 kg (191 lb)   BMI 27.41 kg/m²     Physical Exam  Vitals reviewed.   Constitutional:       General: He is not in acute distress.     Appearance: Normal appearance. He is not ill-appearing.   HENT:      Head: Normocephalic and atraumatic.      Right Ear: Tympanic membrane and ear canal normal.      Left Ear: Tympanic membrane and ear canal normal.      Nose: Nose normal. No congestion or rhinorrhea.      Mouth/Throat:      Mouth: Mucous membranes are moist.      Pharynx: Oropharynx is clear.     Eyes:      Extraocular Movements: " Extraocular movements intact.      Conjunctiva/sclera: Conjunctivae normal.      Pupils: Pupils are equal, round, and reactive to light.       Cardiovascular:      Rate and Rhythm: Normal rate and regular rhythm.      Pulses: Normal pulses.      Heart sounds: Normal heart sounds. No murmur heard.  Pulmonary:      Effort: Pulmonary effort is normal. No respiratory distress.      Breath sounds: Normal breath sounds.   Abdominal:      General: Abdomen is flat. Bowel sounds are normal.      Palpations: Abdomen is soft.      Tenderness: There is no abdominal tenderness.     Musculoskeletal:         General: Normal range of motion.      Cervical back: Normal range of motion and neck supple.      Right lower leg: No edema.   Lymphadenopathy:      Cervical: No cervical adenopathy.     Skin:     General: Skin is warm and dry.      Findings: No rash.     Neurological:      General: No focal deficit present.      Mental Status: He is alert and oriented to person, place, and time.     Psychiatric:         Mood and Affect: Mood normal.         Thought Content: Thought content normal.       Lab Results   Component Value Date    WBC 5.8 05/05/2025    HGB 15.8 05/05/2025    HCT 47.1 05/05/2025     05/05/2025    CHOL 158 02/21/2025    TRIG 56 02/21/2025    HDL 59 02/21/2025    ALT 30 05/05/2025    AST 21 05/05/2025     05/05/2025    K 4.4 05/05/2025     05/05/2025    CREATININE 0.89 05/05/2025    BUN 16 05/05/2025    CO2 27 05/05/2025    TSH 2.39 02/21/2025    PSA 0.30 02/21/2025     par      Assessment/Plan   Assessment & Plan  Adult general medical exam  Continue efforts to make healthy food choices, maintain regular physical activity  Return in follow-up for seasonal flu vaccine  Counseled regarding pneumococcal and Shingrix vaccinations which she wished to defer at present.  Proceed with colonoscopy as previously ordered for colon cancer screening  Most recent PSA normal         Screening for eye  condition  Recommend screening eye exam  Orders:    Referral to Ophthalmology; Future    Attention deficit hyperactivity disorder (ADHD), predominantly inattentive type  Continue methylphenidate at current dose, controlled subs agreement and urine drug screen up-to-date.  Orders:    methylphenidate ER (Concerta) 54 mg extended release tablet; Take 1 tablet (54 mg) by mouth once daily in the morning. Do not crush, chew, or split. Do not fill before August 4, 2025.    Mixed hyperlipidemia  Continue statin, LDL adequately controlled

## 2025-08-02 ASSESSMENT — ENCOUNTER SYMPTOMS
COUGH: 0
CONSTIPATION: 0
BACK PAIN: 0
ABDOMINAL PAIN: 0
DIARRHEA: 0
SHORTNESS OF BREATH: 0
CHEST TIGHTNESS: 0
DYSPHORIC MOOD: 0
NERVOUS/ANXIOUS: 0
PALPITATIONS: 0

## 2025-08-03 NOTE — ASSESSMENT & PLAN NOTE
Continue methylphenidate at current dose, controlled subs agreement and urine drug screen up-to-date.  Orders:    methylphenidate ER (Concerta) 54 mg extended release tablet; Take 1 tablet (54 mg) by mouth once daily in the morning. Do not crush, chew, or split. Do not fill before August 4, 2025.

## 2025-08-11 ENCOUNTER — APPOINTMENT (OUTPATIENT)
Dept: RADIOLOGY | Facility: HOSPITAL | Age: 55
End: 2025-08-11
Payer: COMMERCIAL

## 2025-08-11 DIAGNOSIS — M92.211 OSTEOCHONDROSIS OF LUNATE OF RIGHT WRIST: ICD-10-CM

## 2025-08-11 PROCEDURE — 73200 CT UPPER EXTREMITY W/O DYE: CPT | Mod: RT

## 2025-08-11 PROCEDURE — 73200 CT UPPER EXTREMITY W/O DYE: CPT | Mod: RIGHT SIDE | Performed by: STUDENT IN AN ORGANIZED HEALTH CARE EDUCATION/TRAINING PROGRAM

## 2025-08-14 ENCOUNTER — OFFICE VISIT (OUTPATIENT)
Dept: ORTHOPEDIC SURGERY | Facility: CLINIC | Age: 55
End: 2025-08-14
Payer: COMMERCIAL

## 2025-08-14 DIAGNOSIS — M92.211 OSTEOCHONDROSIS OF LUNATE OF RIGHT WRIST: Primary | ICD-10-CM

## 2025-08-14 DIAGNOSIS — E55.9 VITAMIN D INSUFFICIENCY: ICD-10-CM

## 2025-08-14 DIAGNOSIS — Z98.1 STATUS POST FUSION OF WRIST: ICD-10-CM

## 2025-08-14 PROCEDURE — 1036F TOBACCO NON-USER: CPT | Performed by: ORTHOPAEDIC SURGERY

## 2025-08-14 PROCEDURE — 99212 OFFICE O/P EST SF 10 MIN: CPT | Performed by: ORTHOPAEDIC SURGERY

## 2025-08-14 PROCEDURE — 99214 OFFICE O/P EST MOD 30 MIN: CPT | Performed by: ORTHOPAEDIC SURGERY

## 2025-09-03 ENCOUNTER — PATIENT MESSAGE (OUTPATIENT)
Dept: PRIMARY CARE | Facility: CLINIC | Age: 55
End: 2025-09-03
Payer: COMMERCIAL

## 2025-09-03 DIAGNOSIS — F90.0 ATTENTION DEFICIT HYPERACTIVITY DISORDER (ADHD), PREDOMINANTLY INATTENTIVE TYPE: ICD-10-CM

## 2025-09-03 DIAGNOSIS — E78.5 HYPERLIPIDEMIA, UNSPECIFIED HYPERLIPIDEMIA TYPE: ICD-10-CM

## 2025-09-03 RX ORDER — METHYLPHENIDATE HYDROCHLORIDE 54 MG/1
54 TABLET ORAL EVERY MORNING
Qty: 30 TABLET | Refills: 0 | Status: SHIPPED | OUTPATIENT
Start: 2025-09-03 | End: 2025-10-03

## 2025-09-03 RX ORDER — ROSUVASTATIN CALCIUM 20 MG/1
20 TABLET, COATED ORAL DAILY
Qty: 90 TABLET | Refills: 1 | Status: SHIPPED | OUTPATIENT
Start: 2025-09-03

## 2025-10-15 ENCOUNTER — APPOINTMENT (OUTPATIENT)
Dept: PRIMARY CARE | Facility: CLINIC | Age: 55
End: 2025-10-15
Payer: COMMERCIAL